# Patient Record
Sex: FEMALE | Race: WHITE | NOT HISPANIC OR LATINO | Employment: FULL TIME | URBAN - METROPOLITAN AREA
[De-identification: names, ages, dates, MRNs, and addresses within clinical notes are randomized per-mention and may not be internally consistent; named-entity substitution may affect disease eponyms.]

---

## 2017-08-18 ENCOUNTER — OFFICE VISIT (OUTPATIENT)
Dept: MEDICAL GROUP | Facility: MEDICAL CENTER | Age: 47
End: 2017-08-18
Payer: COMMERCIAL

## 2017-08-18 VITALS
HEIGHT: 67 IN | DIASTOLIC BLOOD PRESSURE: 110 MMHG | WEIGHT: 176 LBS | HEART RATE: 120 BPM | BODY MASS INDEX: 27.62 KG/M2 | OXYGEN SATURATION: 96 % | TEMPERATURE: 97.9 F | SYSTOLIC BLOOD PRESSURE: 160 MMHG

## 2017-08-18 DIAGNOSIS — Z13.0 SCREENING FOR DEFICIENCY ANEMIA: ICD-10-CM

## 2017-08-18 DIAGNOSIS — E03.9 ACQUIRED HYPOTHYROIDISM: ICD-10-CM

## 2017-08-18 DIAGNOSIS — Z13.220 SCREENING FOR HYPERLIPIDEMIA: ICD-10-CM

## 2017-08-18 DIAGNOSIS — N28.9 ABNORMAL RENAL FUNCTION: ICD-10-CM

## 2017-08-18 DIAGNOSIS — K22.4 ESOPHAGEAL SPASM: ICD-10-CM

## 2017-08-18 DIAGNOSIS — E55.9 VITAMIN D DEFICIENCY: ICD-10-CM

## 2017-08-18 DIAGNOSIS — D68.62 LUPUS ANTICOAGULANT SYNDROME (HCC): ICD-10-CM

## 2017-08-18 DIAGNOSIS — J45.20 MILD INTERMITTENT ASTHMA WITHOUT COMPLICATION: ICD-10-CM

## 2017-08-18 DIAGNOSIS — I10 ESSENTIAL HYPERTENSION: ICD-10-CM

## 2017-08-18 DIAGNOSIS — L60.8 DEFORMITY OF TOENAIL: ICD-10-CM

## 2017-08-18 PROBLEM — F41.0 PANIC ATTACK AS REACTION TO STRESS: Status: RESOLVED | Noted: 2017-08-18 | Resolved: 2017-08-18

## 2017-08-18 PROBLEM — F43.0 PANIC ATTACK AS REACTION TO STRESS: Status: ACTIVE | Noted: 2017-08-18

## 2017-08-18 PROBLEM — F41.0 PANIC ATTACK AS REACTION TO STRESS: Status: ACTIVE | Noted: 2017-08-18

## 2017-08-18 PROBLEM — F43.0 PANIC ATTACK AS REACTION TO STRESS: Status: RESOLVED | Noted: 2017-08-18 | Resolved: 2017-08-18

## 2017-08-18 PROCEDURE — 99215 OFFICE O/P EST HI 40 MIN: CPT | Performed by: FAMILY MEDICINE

## 2017-08-18 RX ORDER — NORETHINDRONE 0.35 MG/1
0.35 TABLET ORAL EVERY EVENING
COMMUNITY
Start: 2017-06-24

## 2017-08-18 RX ORDER — LEVOTHYROXINE SODIUM 88 UG/1
TABLET ORAL
COMMUNITY
Start: 2017-06-15 | End: 2018-03-10

## 2017-08-18 ASSESSMENT — PATIENT HEALTH QUESTIONNAIRE - PHQ9: CLINICAL INTERPRETATION OF PHQ2 SCORE: 0

## 2017-08-18 NOTE — PATIENT INSTRUCTIONS
Esophageal Spasm  An esophageal spasm is a muscle spasm of the tube that connects the back of your throat to your stomach (esophagus). Your esophagus normally moves food and liquids down into your stomach with smooth, wavelike muscle contractions. If you have esophageal spasms, abnormal muscle contractions in your esophagus can be painful and cause you to have difficulty swallowing (dysphagia).  There are two types of esophageal spasms. You may have one or both types:  · Diffuse esophageal spasms. These are irregular, uncoordinated muscle movements of the esophagus. The diffuse type causes more dysphagia.  · Nutcracker esophagus. This is a type of muscle contraction that is coordinated but too strong. The muscles move in a regular order, but the contraction is stronger than necessary. The nutcracker type of esophageal spasm is more painful.  Severe cases of esophageal spasm can make it hard to eat well and do all your usual activities. Esophageal spasms often occur along with severe heartburn (reflux esophagitis). Swallowed foods or liquids may also come back up into your throat (regurgitation).   CAUSES   The cause of esophageal spasm is not known.  RISK FACTORS  You may have a higher risk for esophageal spasm if you:  · Are female.  · Are an older person.  · Eat very quickly.  · Swallow foods or drinks that are very hot or very cold.  · Are depressed or anxious.  SIGNS AND SYMPTOMS   Signs and symptoms can vary from day to day. They may be mild or severe. They may last for minutes or hours. Common signs and symptoms include:  · Chest pain. This may feel like a heart attack.  · Back pain.  · Dysphagia.  · Heartburn.  · The feeling that something is stuck in your throat (globus).  · Regurgitation of foods or liquids.  DIAGNOSIS   Your health care provider may suspect esophageal spasm based on your symptoms and a physical exam. Tests may be done to help confirm the diagnosis. These may include:  · Endoscopy. A  flexible telescope is passed into your esophagus.  · Barium swallow. An X-ray is done while you drink a substance (contrast material) that shows up on X-ray.  · Esophageal manometry. Pressure measurements of the inside of your esophagus are taken while you swallow.  TREATMENT   Mild cases of esophageal spasms may not need to be treated. You may be able to manage the spasms by avoiding the foods and eating habits that trigger them. Treatment for spasms that are more severe or frequent can include the following:  · You may be given medicine to:  ¨ Relax the esophageal muscles.  ¨ Relieve muscle spasms (calcium channel blockers and nitrates).  ¨ Block nerve endings in the esophagus. This is done with an injection of a certain toxin (botulinum).  ¨ Relieve heartburn (proton pump inhibitors).  · Antidepressant medicines are sometimes used to ease symptoms.  · For severe cases, surgery is sometimes done to reduce esophageal muscle contractions (myotomy).  HOME CARE INSTRUCTIONS   · Take medicines only as directed by your health care provider.  · Do not eat foods that trigger heartburn or esophageal spasms.  · Eat your meals slowly.  · Chew your food completely.  · Avoid foods and drinks that are very hot or very cold.  · Find ways to manage stress.  · Ask for help if you struggle with depression or anxiety.  · Keep all follow-up visits as directed by your health care provider. This is important.  SEEK MEDICAL CARE IF:   · Your symptoms change or get worse.  · You are losing weight because of dysphagia.  · Your medicine is not helping your symptoms.  · Your esophageal spasms are interfering with your quality of life.  SEEK IMMEDIATE MEDICAL CARE IF:   · You have very bad or unusual chest pain.  · You have trouble breathing.  · You choke.  MAKE SURE YOU:  · Understand these instructions.  · Will watch your condition.  · Will get help right away if you are not doing well or get worse.     This information is not intended to  replace advice given to you by your health care provider. Make sure you discuss any questions you have with your health care provider.     Document Released: 03/09/2004 Document Revised: 01/08/2016 Document Reviewed: 03/13/2015  [employer] Interactive Patient Education ©2016 [employer]

## 2017-08-18 NOTE — ASSESSMENT & PLAN NOTE
Patient complains of episodes of a crushing chest pain that radiates up to her throat. This usually occurs when she is under a lot of stress. She can't swallow when it's occurring. It lasts for about 15 minutes and then subsides. She did have a negative swallowing test at [facility] year ago. She also had a negative cardiac workup that we are trying to get hold of. She denies any shortness of breath when this happens. She does not have a lot of reflux type symptoms. No excess belching or flatulence.

## 2017-08-18 NOTE — Clinical Note
[facility]  Ewelina Hernandez M.D.  [address]  [place] [address]  Fax: [phone]   Authorization for Release/Disclosure of   Protected Health Information   Name: PENNY MCCALL : 1970 SSN: [id number]   Address: [address] [place] [address] Phone:    [phone] (home)    I authorize the entity listed below to release/disclose the PHI below to:   [facility]/Ewelina Hernandez M.D. and Ewelina Hernandez M.D.   Provider or Entity Name:     Address   City, State, Zip   Phone:      Fax:     Reason for request: continuity of care   Information to be released:    [  ] LAST COLONOSCOPY,  including any PATH REPORT and follow-up  [  ] LAST FIT/COLOGUARD RESULT [  ] LAST DEXA  [  ] LAST MAMMOGRAM  [  ] LAST PAP  [  ] LAST LABS [  ] RETINA EXAM REPORT  [  ] IMMUNIZATION RECORDS  [  ] Release all info      [  ] Check here and initial the line next to each item to release ALL health information INCLUDING  _____ Care and treatment for drug and / or alcohol abuse  _____ HIV testing, infection status, or AIDS  _____ Genetic Testing    DATES OF SERVICE OR TIME PERIOD TO BE DISCLOSED: _____________  I understand and acknowledge that:  * This Authorization may be revoked at any time by you in writing, except if your health information has already been used or disclosed.  * Your health information that will be used or disclosed as a result of you signing this authorization could be re-disclosed by the recipient. If this occurs, your re-disclosed health information may no longer be protected by State or Federal laws.  * You may refuse to sign this Authorization. Your refusal will not affect your ability to obtain treatment.  * This Authorization becomes effective upon signing and will  on (date) __________.      If no date is indicated, this Authorization will  one (1) year from the signature date.    Name: Penny Mccall    Signature:   Date:     2017       PLEASE FAX REQUESTED  RECORDS BACK TO: [phone]

## 2017-08-18 NOTE — MR AVS SNAPSHOT
"        Penny Todd   2017 9:40 AM   Office Visit   MRN: [id number]    Department:  [facility]   Dept Phone:  [phone]    Description:  Female : 1970   Provider:  Ewelina Hernandez M.D.           Reason for Visit     Establish Care     Blood Pressure Problem           Allergies as of 2017     Allergen Noted Reactions    Sulfa Drugs 2016         You were diagnosed with     Acquired hypothyroidism   [0481126]       Lupus anticoagulant syndrome (CMS-HCC)   [320008]       Essential hypertension   [7352205]       Mild intermittent asthma without complication   [732387]       Esophageal spasm   [609381]       Deformity of toenail   [296674]       Vitamin D deficiency   [5705997]       Abnormal renal function   [841374]       Screening for hyperlipidemia   [136428]       Screening for deficiency anemia   [500390]         Vital Signs     Blood Pressure Pulse Temperature Height Weight Body Mass Index    160/110 mmHg 120 36.6 °C (97.9 °F) 1.702 m (5' 7.01\") 79.833 kg (176 lb) 27.56 kg/m2    Oxygen Saturation Last Menstrual Period Breastfeeding? Smoking Status          96% 2017 No Never Smoker         Basic Information     Date Of Birth Sex Race Ethnicity Preferred Language    1970 Female White Non- English      Problem List              ICD-10-CM Priority Class Noted - Resolved    Acquired hypothyroidism E03.9   2017 - Present    Lupus anticoagulant syndrome (CMS-HCC) D68.62   2017 - Present    Essential hypertension I10   2017 - Present    Mild intermittent asthma without complication J45.20   2017 - Present    Esophageal spasm K22.4   2017 - Present    Deformity of toenail L60.8   2017 - Present    Vitamin D deficiency E55.9   2017 - Present    Abnormal renal function N28.9   2017 - Present      Health Maintenance        Date Due Completion Dates    IMM DTaP/Tdap/Td Vaccine (1 - Tdap) 1989 ---    IMM PNEUMOCOCCAL  " (ADULT) MEDIUM RISK SERIES (1 of 1 - PPSV23) 6/30/1989 ---    PAP SMEAR 6/30/1991 ---    MAMMOGRAM 6/30/2010 ---    IMM INFLUENZA (1) 9/1/2017 10/11/2013, 12/14/2012            Current Immunizations     Influenza Vaccine Quad Inj (Pf) 12/14/2012    Influenza Vaccine Quad Inj (Preserved) 10/11/2013      Below and/or attached are the medications your provider expects you to take. Review all of your home medications and newly ordered medications with your provider and/or pharmacist. Follow medication instructions as directed by your provider and/or pharmacist. Please keep your medication list with you and share with your provider. Update the information when medications are discontinued, doses are changed, or new medications (including over-the-counter products) are added; and carry medication information at all times in the event of emergency situations     Allergies:  SULFA DRUGS - (reactions not documented)               Medications  Valid as of: August 18, 2017 - 10:36 AM    Generic Name Brand Name Tablet Size Instructions for use    Albuterol Sulfate (Aero Soln) albuterol 108 (90 Base) MCG/ACT Inhale 2 Puffs by mouth every 6 hours as needed for Shortness of Breath.        Levothyroxine Sodium   Take  by mouth.        Levothyroxine Sodium (Tab) SYNTHROID 88 MCG         Multiple Vitamins-Minerals   Take  by mouth.        Norethindrone (Tab) JENCYCLA 0.35 MG         Promethazine-Phenyleph-Codeine (Syrup) PHENERGAN VC CODEINE 5-6.25-10 MG/5ML Take 5 mL by mouth every 8 hours as needed.        Pseudoeph-Doxylamine-DM-APAP   Take  by mouth.        .                 Medicines prescribed today were sent to:     [facility] [address] [place] [address]    Phone: [phone] Fax: [phone]    Open 24 Hours?: No      Medication refill instructions:       If your prescription bottle indicates you have medication refills left, it is not necessary to call your provider’s  office. Please contact your pharmacy and they will refill your medication.    If your prescription bottle indicates you do not have any refills left, you may request refills at any time through one of the following ways: The online [employer] system (except Urgent Care), by calling your provider’s office, or by asking your pharmacy to contact your provider’s office with a refill request. Medication refills are processed only during regular business hours and may not be available until the next business day. Your provider may request additional information or to have a follow-up visit with you prior to refilling your medication.   *Please Note: Medication refills are assigned a new Rx number when refilled electronically. Your pharmacy may indicate that no refills were authorized even though a new prescription for the same medication is available at the pharmacy. Please request the medicine by name with the pharmacy before contacting your provider for a refill.        Your To Do List     Future Labs/Procedures Complete By Expires    JAIRO ANTIBODY WITH REFLEX  As directed 8/19/2018    CBC WITH DIFFERENTIAL  As directed 8/19/2018    COMP METABOLIC PANEL  As directed 8/19/2018    FREE THYROXINE  As directed 8/19/2018    LIPID PROFILE  As directed 8/19/2018    LUPUS ANTICOAGULANT  As directed 8/18/2018    TRIIDOTHYRONINE  As directed 8/18/2018    TSH  As directed 8/19/2018    URINALYSIS,CULTURE IF INDICATED  As directed 8/18/2018    VITAMIN D,25 HYDROXY  As directed 8/19/2018    WESTERGREN SED RATE  As directed 8/19/2018      Referral     A referral request has been sent to our patient care coordination department. Please allow 3-5 business days for us to process this request and contact you either by phone or mail. If you do not hear from us by the 5th business day, please call us at [phone].        Instructions    Esophageal Spasm  An esophageal spasm is a muscle spasm of the tube that connects the back of your  throat to your stomach (esophagus). Your esophagus normally moves food and liquids down into your stomach with smooth, wavelike muscle contractions. If you have esophageal spasms, abnormal muscle contractions in your esophagus can be painful and cause you to have difficulty swallowing (dysphagia).  There are two types of esophageal spasms. You may have one or both types:  · Diffuse esophageal spasms. These are irregular, uncoordinated muscle movements of the esophagus. The diffuse type causes more dysphagia.  · Nutcracker esophagus. This is a type of muscle contraction that is coordinated but too strong. The muscles move in a regular order, but the contraction is stronger than necessary. The nutcracker type of esophageal spasm is more painful.  Severe cases of esophageal spasm can make it hard to eat well and do all your usual activities. Esophageal spasms often occur along with severe heartburn (reflux esophagitis). Swallowed foods or liquids may also come back up into your throat (regurgitation).   CAUSES   The cause of esophageal spasm is not known.  RISK FACTORS  You may have a higher risk for esophageal spasm if you:  · Are female.  · Are an older person.  · Eat very quickly.  · Swallow foods or drinks that are very hot or very cold.  · Are depressed or anxious.  SIGNS AND SYMPTOMS   Signs and symptoms can vary from day to day. They may be mild or severe. They may last for minutes or hours. Common signs and symptoms include:  · Chest pain. This may feel like a heart attack.  · Back pain.  · Dysphagia.  · Heartburn.  · The feeling that something is stuck in your throat (globus).  · Regurgitation of foods or liquids.  DIAGNOSIS   Your health care provider may suspect esophageal spasm based on your symptoms and a physical exam. Tests may be done to help confirm the diagnosis. These may include:  · Endoscopy. A flexible telescope is passed into your esophagus.  · Barium swallow. An X-ray is done while you drink a  substance (contrast material) that shows up on X-ray.  · Esophageal manometry. Pressure measurements of the inside of your esophagus are taken while you swallow.  TREATMENT   Mild cases of esophageal spasms may not need to be treated. You may be able to manage the spasms by avoiding the foods and eating habits that trigger them. Treatment for spasms that are more severe or frequent can include the following:  · You may be given medicine to:  ¨ Relax the esophageal muscles.  ¨ Relieve muscle spasms (calcium channel blockers and nitrates).  ¨ Block nerve endings in the esophagus. This is done with an injection of a certain toxin (botulinum).  ¨ Relieve heartburn (proton pump inhibitors).  · Antidepressant medicines are sometimes used to ease symptoms.  · For severe cases, surgery is sometimes done to reduce esophageal muscle contractions (myotomy).  HOME CARE INSTRUCTIONS   · Take medicines only as directed by your health care provider.  · Do not eat foods that trigger heartburn or esophageal spasms.  · Eat your meals slowly.  · Chew your food completely.  · Avoid foods and drinks that are very hot or very cold.  · Find ways to manage stress.  · Ask for help if you struggle with depression or anxiety.  · Keep all follow-up visits as directed by your health care provider. This is important.  SEEK MEDICAL CARE IF:   · Your symptoms change or get worse.  · You are losing weight because of dysphagia.  · Your medicine is not helping your symptoms.  · Your esophageal spasms are interfering with your quality of life.  SEEK IMMEDIATE MEDICAL CARE IF:   · You have very bad or unusual chest pain.  · You have trouble breathing.  · You choke.  MAKE SURE YOU:  · Understand these instructions.  · Will watch your condition.  · Will get help right away if you are not doing well or get worse.     This information is not intended to replace advice given to you by your health care provider. Make sure you discuss any questions you have  with your health care provider.     Document Released: 03/09/2004 Document Revised: 01/08/2016 Document Reviewed: 03/13/2015  [employer] Interactive Patient Education ©2016 [employer]            [employer] Access Code: Activation code not generated  Current Write.my Status: Active

## 2017-08-18 NOTE — ASSESSMENT & PLAN NOTE
Patient recently started taking Plexus Slim supplement that has green coffee bean extract.  The elevations in her blood pressure seemed to have been since then. She was on medications in the past but had stopped those because her blood pressure had normalized. She is having occasional headaches but no syncope. No palpitations.

## 2017-08-18 NOTE — ASSESSMENT & PLAN NOTE
Took terbinofine for one month but developed a rash.  Used bleach at that helped and then OTC undecylenic acid. She has some thickening of the nail and it feels as if it's pulling up.

## 2017-08-18 NOTE — ASSESSMENT & PLAN NOTE
Patient had a creatinine of 1.02 and 10/25/16 with a GFR of 58. Her labs have been normal prior to that.

## 2017-08-18 NOTE — ASSESSMENT & PLAN NOTE
Currently taking levothyroxine 88 mcg daily as prescribed, it was decreased from 100 mcg on 6/15/17.  Denies palpitations, skin changes, temperature intolerance, or changes in bowel habits  3 T4 on 6/12/17 was 1.26 with a TSH of 0.31  TSH was 0.66 on 10/25/16 and 0.45 on 6/10/16

## 2017-08-25 ENCOUNTER — TELEPHONE (OUTPATIENT)
Dept: MEDICAL GROUP | Facility: MEDICAL CENTER | Age: 47
End: 2017-08-25

## 2017-08-25 RX ORDER — LISINOPRIL 10 MG/1
10 TABLET ORAL DAILY
Qty: 30 TAB | Refills: 2 | Status: SHIPPED | OUTPATIENT
Start: 2017-08-25 | End: 2017-09-18

## 2017-08-25 NOTE — ASSESSMENT & PLAN NOTE
Patient reports that she had a positive lupus anticoagulant test and had multiple miscarriages. I reviewed her [facility] records and her anticoagulant was actually negative on her last testing. We do not have labs from further back. She denies any blood clots or pulmonary embolism.

## 2017-08-25 NOTE — ASSESSMENT & PLAN NOTE
Patient only gets symptoms with exercise and in the spring. She does not currently have an albuterol prescription but would like one. She is uses in the past with good effect.  Denies recent or current exacerbation.  Denies current shortness of breath, chest pain, or cough.

## 2017-08-25 NOTE — TELEPHONE ENCOUNTER
Patient called states she did receive your my chart message and she would like to start on the blood pressure medication. She does have an appt on 9/18/17 to follow up with you.

## 2017-08-25 NOTE — PROGRESS NOTES
Chief Complaint   Patient presents with   • Establish Care   • Blood Pressure Problem         Penny Todd is a 47 y.o. female here to establish care and for evaluation and management of:        HPI:    Acquired hypothyroidism   Currently taking levothyroxine 88 mcg daily as prescribed, it was decreased from 100 mcg on 6/15/17.  Denies palpitations, skin changes, temperature intolerance, or changes in bowel habits  3 T4 on 6/12/17 was 1.26 with a TSH of 0.31  TSH was 0.66 on 10/25/16 and 0.45 on 6/10/16      Panic attack as reaction to stress  Feels like he gets a bubble in her throat and that she can't swallow    Deformity of toenail  Took terbinofine for one month but developed a rash.  Used bleach at that helped and then OTC undecylenic acid. She has some thickening of the nail and it feels as if it's pulling up.    Essential hypertension  Patient recently started taking Plexus Slim supplement that has green coffee bean extract.  The elevations in her blood pressure seemed to have been since then. She was on medications in the past but had stopped those because her blood pressure had normalized. She is having occasional headaches but no syncope. No palpitations.    Esophageal spasm  Patient complains of episodes of a crushing chest pain that radiates up to her throat. This usually occurs when she is under a lot of stress. She can't swallow when it's occurring. It lasts for about 15 minutes and then subsides. She did have a negative swallowing test at [facility] year ago. She also had a negative cardiac workup that we are trying to get hold of. She denies any shortness of breath when this happens. She does not have a lot of reflux type symptoms. No excess belching or flatulence.    Abnormal renal function  Patient had a creatinine of 1.02 and 10/25/16 with a GFR of 58. Her labs have been normal prior to that.    Vitamin D deficiency  Vitamin D was 33 on 6/10/16.    Mild intermittent asthma without  complication  Patient only gets symptoms with exercise and in the spring. She does not currently have an albuterol prescription but would like one. She is uses in the past with good effect.  Denies recent or current exacerbation.  Denies current shortness of breath, chest pain, or cough.            Lupus anticoagulant syndrome (CMS-HCC)  Patient reports that she had a positive lupus anticoagulant test and had multiple miscarriages. I reviewed her [facility] records and her anticoagulant was actually negative on her last testing. We do not have labs from further back. She denies any blood clots or pulmonary embolism.      Allergies   Allergen Reactions   • Sulfa Drugs        Current medicines (including changes today)  Current Outpatient Prescriptions   Medication Sig Dispense Refill   • levothyroxine (SYNTHROID) 88 MCG Tab      • JENCYCLA 0.35 MG tablet      • albuterol (VENTOLIN OR PROVENTIL) 108 (90 BASE) MCG/ACT Aero Soln inhalation aerosol Inhale 2 Puffs by mouth every 6 hours as needed for Shortness of Breath. 8.5 g 1   • Pseudoeph-Doxylamine-DM-APAP (NYQUIL PO) Take  by mouth.     • LEVOTHYROXINE SODIUM PO Take  by mouth.     • Multiple Vitamins-Minerals (CENTRUM ADULTS PO) Take  by mouth.     • prometh-phenylephrine-codeine (PHENERGAN VC CODEINE) 5-6.25-10 MG/5ML Syrup Take 5 mL by mouth every 8 hours as needed. 70 mL 0     No current facility-administered medications for this visit.     She  has a past medical history of Allergy; Anxiety; Asthma; Clotting disorder (CMS-[facility]); Thyroid disease; and Arrhythmia.  She  has no past surgical history on file.  Social History   Substance Use Topics   • Smoking status: Never Smoker    • Smokeless tobacco: Never Used   • Alcohol Use: No     Social History     Social History Narrative     Family History   Problem Relation Age of Onset   • Heart Disease Father    • Alcohol/Drug Father    • Hyperlipidemia Father    • Hypertension Father      Family Status   Relation Status  "Death Age   • Mother Other    • Father Alive    • Sister Alive    • Sister Alive          ROS  No fever or chills.  No nausea or vomiting.  No palpitations.  No cough or SOB.  No pain with urination or hematuria.  No black or bloody stools.  All other systems reviewed and are negative     Objective:     Blood pressure 160/110, pulse 120, temperature 36.6 °C (97.9 °F), height 1.702 m (5' 7.01\"), weight 79.833 kg (176 lb), last menstrual period 04/18/2017, SpO2 96 %, not currently breastfeeding. Body mass index is 27.56 kg/(m^2).  Physical Exam:      Well developed, well nourished.  Alert, oriented in no acute distress.  Psych: Eye contact is good, speech goal directed, affect anxious  Eyes: conjunctiva non-injected, sclera non-icteric.  Ears: Pinna normal. TM pearly gray.   Nose: Nares are patent.  Normal mucosa  Mouth: Oral mucous membranes pink and moist with no lesions.  Neck Supple.  No adenopathy or masses in the neck or supraclavicular regions. No thyromegaly  Lungs: clear to auscultation bilaterally with good excursion. No wheezes or rhonchi  CV: regular rate and rhythm. No murmur  Abdomen: soft, nontender, no masses or organomegaly.  No rebound or guarding  Ext: no edema, color normal, vascularity normal, temperature normal. Right toenail with apparent healthy nail underneath with overgrowth of the thickened nail with some lifting      Assessment and Plan:   The following treatment plan was discussed    1. Acquired hypothyroidism  Recheck labs and adjust dose as needed  - TSH; Future  - FREE THYROXINE; Future  - TRIIDOTHYRONINE; Future    2. Lupus anticoagulant syndrome (CMS-HCC)  Recheck labs. Consider rheumatology referral  - CBC WITH DIFFERENTIAL; Future  - JAIRO ANTIBODY WITH REFLEX; Future  - WESTERGREN SED RATE; Future  - LUPUS ANTICOAGULANT; Future    3. Essential hypertension  Stop her current supplement and monitor blood pressures. Patient to send me the records of those and we'll consider medication " at that time.    4. Mild intermittent asthma without complication  Refill albuterol for occasional use    5. Esophageal spasm  Refer to GI. Patient education material given  - REFERRAL TO GASTROENTEROLOGY    6. Deformity of toenail  Discussed that this may need to be removed. Refer to podiatry for further evaluation and treatment  - REFERRAL TO PODIATRY    7. Vitamin D deficiency  Check vitamin D level and adjust supplementation as needed  - VITAMIN D,25 HYDROXY; Future    8. Abnormal renal function  Recheck labs  - CBC WITH DIFFERENTIAL; Future  - COMP METABOLIC PANEL; Future    9. Screening for hyperlipidemia  Screening labs ordered.  Await results for counseling.    - LIPID PROFILE; Future    10. Screening for deficiency anemia  Screening labs ordered.  Await results for counseling.    - CBC WITH DIFFERENTIAL; Future      Records requested.  Spent 50 minutes in face-to-face patient contact in which greater than 50% of the visit was spent in counseling/coordination of care       Any change or worsening of signs or symptoms, patient encouraged to follow-up or report to the emergency room for further evaluation. Patient understands and agrees.    Followup: Return in about 3 months (around 11/18/2017).

## 2017-08-30 ENCOUNTER — TELEPHONE (OUTPATIENT)
Dept: MEDICAL GROUP | Facility: MEDICAL CENTER | Age: 47
End: 2017-08-30

## 2017-08-30 NOTE — TELEPHONE ENCOUNTER
Patient called states that she has noticed her pulse is running in the 90s and she thinks it has to do with her BP medication. I advised patient that her pulse was 120 at her initial visit and that the BP medication should not affect her pulse. She states she takes her BP medication everyday at 12:30 pm, I advised to take her medication at dinner time and monitor her BP reading/pulse and send us a my chart message with her readings to see if that will change.

## 2017-09-18 ENCOUNTER — OFFICE VISIT (OUTPATIENT)
Dept: MEDICAL GROUP | Facility: MEDICAL CENTER | Age: 47
End: 2017-09-18
Payer: COMMERCIAL

## 2017-09-18 VITALS
BODY MASS INDEX: 27.31 KG/M2 | SYSTOLIC BLOOD PRESSURE: 140 MMHG | DIASTOLIC BLOOD PRESSURE: 90 MMHG | WEIGHT: 174 LBS | HEART RATE: 113 BPM | HEIGHT: 67 IN | OXYGEN SATURATION: 98 % | TEMPERATURE: 98.3 F

## 2017-09-18 DIAGNOSIS — I10 ESSENTIAL HYPERTENSION: ICD-10-CM

## 2017-09-18 DIAGNOSIS — F41.8 SITUATIONAL ANXIETY: ICD-10-CM

## 2017-09-18 PROCEDURE — 99214 OFFICE O/P EST MOD 30 MIN: CPT | Performed by: FAMILY MEDICINE

## 2017-09-18 RX ORDER — LORAZEPAM 0.5 MG/1
0.5 TABLET ORAL
Qty: 15 TAB | Refills: 0 | Status: ON HOLD | OUTPATIENT
Start: 2017-09-18 | End: 2019-09-18

## 2017-09-18 RX ORDER — LISINOPRIL 5 MG/1
5 TABLET ORAL 2 TIMES DAILY
Qty: 60 TAB | Refills: 3 | Status: SHIPPED | OUTPATIENT
Start: 2017-09-18 | End: 2017-09-20 | Stop reason: SDUPTHER

## 2017-09-19 NOTE — ASSESSMENT & PLAN NOTE
Patient would like to a prescription for when she has the episodes where she feels likes she can't breathe or swallow. These usually only lasts 15 minutes and have been decreased with her blood pressure under control. She is fairly anxious by nature.

## 2017-09-19 NOTE — PROGRESS NOTES
Subjective:     Chief Complaint   Patient presents with   • Follow-Up   • Anxiety       Penny Todd is a 47 y.o. female here today for evaluation and management of:    Essential hypertension   Currently taking Prinivil as directed.   She is not taking baby aspirin daily. 120/83, 111/67, 127/87  She is monitoring BP at home.  Her readings   Denies symptoms low BP: light-headed, tunnel-vision, unusual fatigue.   Denies symptoms high BP:pounding headache, visual changes, flushed face.   Denies medicine side effects: unusual fatigue, slow heartbeat, foot/leg swelling, cough.  She gets a pounding heart as her dose wears off.      Situational anxiety  Patient would like to a prescription for when she has the episodes where she feels likes she can't breathe or swallow. These usually only lasts 15 minutes and have been decreased with her blood pressure under control. She is fairly anxious by nature.       Allergies   Allergen Reactions   • Sulfa Drugs        Current medicines (including changes today)  Current Outpatient Prescriptions   Medication Sig Dispense Refill   • lisinopril (PRINIVIL) 5 MG Tab Take 1 Tab by mouth 2 times a day. 60 Tab 3   • lorazepam (ATIVAN) 0.5 MG Tab Take 1 Tab by mouth 1 time daily as needed for Anxiety. 15 Tab 0   • levothyroxine (SYNTHROID) 88 MCG Tab      • JENCYCLA 0.35 MG tablet      • Multiple Vitamins-Minerals (CENTRUM ADULTS PO) Take  by mouth.     • albuterol (VENTOLIN OR PROVENTIL) 108 (90 BASE) MCG/ACT Aero Soln inhalation aerosol Inhale 2 Puffs by mouth every 6 hours as needed for Shortness of Breath. 8.5 g 1   • Pseudoeph-Doxylamine-DM-APAP (NYQUIL PO) Take  by mouth.     • LEVOTHYROXINE SODIUM PO Take  by mouth.     • prometh-phenylephrine-codeine (PHENERGAN VC CODEINE) 5-6.25-10 MG/5ML Syrup Take 5 mL by mouth every 8 hours as needed. 70 mL 0     No current facility-administered medications for this visit.        She  has a past medical history of Allergy; Anxiety;  "Arrhythmia; Asthma; Clotting disorder (CMS-HCC); and Thyroid disease.    Patient Active Problem List    Diagnosis Date Noted   • Situational anxiety 09/18/2017   • Acquired hypothyroidism 08/18/2017   • Lupus anticoagulant syndrome (CMS-HCC) 08/18/2017   • Essential hypertension 08/18/2017   • Mild intermittent asthma without complication 08/18/2017   • Esophageal spasm 08/18/2017   • Deformity of toenail 08/18/2017   • Vitamin D deficiency 08/18/2017   • Abnormal renal function 08/18/2017       ROS   No fever or chills.  No nausea or vomiting.  No chest pain or palpitations.  No cough or SOB.  No pain with urination or hematuria.  No black or bloody stools.       Objective:     Blood pressure 140/90, pulse (!) 113, temperature 36.8 °C (98.3 °F), height 1.702 m (5' 7\"), weight 78.9 kg (174 lb), SpO2 98 %. Body mass index is 27.25 kg/m².   Physical Exam:  Well developed, well nourished.  Alert, oriented in no acute distress.  Eye contact is good, speech goal directed, affect calm  Eyes: conjunctiva non-injected, sclera non-icteric.  Neck Supple.  No adenopathy or masses in the neck or supraclavicular regions. No thyromegaly  Lungs: clear to auscultation bilaterally with good excursion. No wheezes or rhonchi  CV:Tachycardic but regular rhythm. No murmur        Assessment and Plan:   The following treatment plan was discussed    1. Essential hypertension  Switch to lisinopril 5 mg twice a day to see if she has more even response. Patient to be no me in 3 weeks to let me know how she is doing  - lisinopril (PRINIVIL) 5 MG Tab; Take 1 Tab by mouth 2 times a day.  Dispense: 60 Tab; Refill: 3    2. Situational anxiety  Discussed that he will take 30 minutes for the medicine to kick in. We discussed relaxation techniques. Okay to use lorazepam sublingually for severe attacks.  - lorazepam (ATIVAN) 0.5 MG Tab; Take 1 Tab by mouth 1 time daily as needed for Anxiety.  Dispense: 15 Tab; Refill: 0    Any change or worsening of " signs or symptoms, patient encouraged to follow-up or report to the emergency room for further evaluation. Patient understands and agrees.    Followup: Return in about 6 months (around 3/18/2018).

## 2017-09-19 NOTE — ASSESSMENT & PLAN NOTE
Currently taking Prinivil as directed.   She is not taking baby aspirin daily. 120/83, 111/67, 127/87  She is monitoring BP at home.  Her readings   Denies symptoms low BP: light-headed, tunnel-vision, unusual fatigue.   Denies symptoms high BP:pounding headache, visual changes, flushed face.   Denies medicine side effects: unusual fatigue, slow heartbeat, foot/leg swelling, cough.  She gets a pounding heart as her dose wears off.

## 2017-09-20 DIAGNOSIS — I10 ESSENTIAL HYPERTENSION: ICD-10-CM

## 2017-09-20 RX ORDER — LISINOPRIL 5 MG/1
5 TABLET ORAL 2 TIMES DAILY
Qty: 180 TAB | Refills: 3 | Status: SHIPPED | OUTPATIENT
Start: 2017-09-20 | End: 2018-06-13

## 2017-11-17 ENCOUNTER — TELEPHONE (OUTPATIENT)
Dept: MEDICAL GROUP | Facility: MEDICAL CENTER | Age: 47
End: 2017-11-17

## 2017-11-17 NOTE — TELEPHONE ENCOUNTER
1. Caller Name: Pt                      Call Back Number: [phone] until 2:30 or [phone] (home)       2. Message: Pt called stating she now is taking Lisinopril 5 mg in the morning. She has been feeling different lately and having head rushes when standing, but not like she is going to faint. Last night her BP was 109/60. Should she cut the 5mg of Lisinopril.     3. Patient approves office to leave a detailed voicemail/MyChart message: n/a

## 2018-03-09 DIAGNOSIS — E03.9 ACQUIRED HYPOTHYROIDISM: ICD-10-CM

## 2018-03-10 RX ORDER — LEVOTHYROXINE SODIUM 88 UG/1
88 TABLET ORAL
Qty: 30 TAB | Refills: 0 | Status: SHIPPED | OUTPATIENT
Start: 2018-03-10 | End: 2018-04-10 | Stop reason: SDUPTHER

## 2018-03-20 NOTE — TELEPHONE ENCOUNTER
Patient should be advised to go to the ER if she is having chest pain.  Labs ordered.  Ewelina Hernandez M.D.

## 2018-03-20 NOTE — TELEPHONE ENCOUNTER
Pt called in and is asking to have her Thyroid lab order faxed to [facility] in [place]. Pt states she has left several VM requesting this and has not heard back. Pt is going to document her BP readings and send them in a [employer] message as she is feeling some pressure when she stands up after sitting and feeling stressed. Pt will schedule an apt if you would like her to come in office, but would rather send readings through [employer] to start out.

## 2018-04-10 DIAGNOSIS — E03.9 ACQUIRED HYPOTHYROIDISM: ICD-10-CM

## 2018-04-10 NOTE — TELEPHONE ENCOUNTER
From: Penny Todd  Sent: 4/10/2018 11:23 AM PDT  Subject: Medication Renewal Request    Penny Todd would like a refill of the following medications:     levothyroxine (SYNTHROID) 88 MCG Tab [Ewelina Hernandez M.D.]   Patient Comment: I did lab work few weeks ago. [facility] pharmacy does not show a new prescription yet. Please send new prescription.    Preferred pharmacy: [facility] [address] [place], [place] - [address]

## 2018-04-12 RX ORDER — LEVOTHYROXINE SODIUM 88 UG/1
88 TABLET ORAL
Qty: 90 TAB | Refills: 1 | Status: SHIPPED | OUTPATIENT
Start: 2018-04-12 | End: 2018-10-13 | Stop reason: SDUPTHER

## 2018-05-30 ENCOUNTER — OFFICE VISIT (OUTPATIENT)
Dept: MEDICAL GROUP | Facility: PHYSICIAN GROUP | Age: 48
End: 2018-05-30
Payer: COMMERCIAL

## 2018-05-30 VITALS
WEIGHT: 177.9 LBS | DIASTOLIC BLOOD PRESSURE: 90 MMHG | SYSTOLIC BLOOD PRESSURE: 140 MMHG | TEMPERATURE: 98.6 F | RESPIRATION RATE: 16 BRPM | OXYGEN SATURATION: 96 % | BODY MASS INDEX: 27.92 KG/M2 | HEART RATE: 95 BPM | HEIGHT: 67 IN

## 2018-05-30 DIAGNOSIS — I10 ESSENTIAL HYPERTENSION: ICD-10-CM

## 2018-05-30 DIAGNOSIS — N95.1 PERIMENOPAUSE: ICD-10-CM

## 2018-05-30 DIAGNOSIS — M67.431 GANGLION, RIGHT WRIST: ICD-10-CM

## 2018-05-30 DIAGNOSIS — F41.1 GAD (GENERALIZED ANXIETY DISORDER): Primary | ICD-10-CM

## 2018-05-30 DIAGNOSIS — I10 ELEVATED BLOOD PRESSURE READING IN OFFICE WITH WHITE COAT SYNDROME, WITH DIAGNOSIS OF HYPERTENSION: ICD-10-CM

## 2018-05-30 DIAGNOSIS — F41.1 GENERALIZED ANXIETY DISORDER: ICD-10-CM

## 2018-05-30 PROCEDURE — 99214 OFFICE O/P EST MOD 30 MIN: CPT | Performed by: NURSE PRACTITIONER

## 2018-05-30 RX ORDER — CITALOPRAM HYDROBROMIDE 10 MG/1
10 TABLET ORAL DAILY
Qty: 30 TAB | Refills: 2 | Status: SHIPPED | OUTPATIENT
Start: 2018-05-30 | End: 2018-07-16 | Stop reason: SDUPTHER

## 2018-05-30 NOTE — PROGRESS NOTES
[place] [facility]  Primary Care Office Visit - Problem-Oriented        History:     Penny Todd is a 47 y.o. female who is here today to discuss Hypertension ([facility])      Ganglion, right wrist  Patient is a 47-year-old female who has noticed a nodularity on the lateral right wrist that has been persistent for a few months.  It is nontender, no redness, no fluctuance.     Essential hypertension  Patient is a 47-year-old female with hypertension here for follow-up.  She was diagnosed over a year ago and has slowly tapered down on her lisinopril due to multiple hypotensive episodes.  Currently utilizing lisinopril 2.5 mg at night only.  Blood pressure readings have been generally 112-130/70-88.  She has had one reading of 140 SBP in the last 2 weeks.  She has noted that with blood pressure readings ranging 112-120 she has lightheadedness.  She also reports anxiety, she tells me that when she wakes up she immediately starts to feel anxious.  Her anxiety has never formally been addressed, she feels anxious when driving, in new places, around new people.  She likely has a component of whitecoat syndrome.  Her repeat blood pressure today is 142/90, her home blood pressure cuff reads 152/100.  She does admit that she is quite anxious meeting a new provider today and begin a new clinic.  She also had to drive herself to this appointment on her own which is particularly anxiety provoking.  She denies chest pain, shortness of breath, change in vision, headaches.  She is due for labs.            Generalized anxiety disorder  Patient is a 47-year-old female with anxiety that affects her ability to perform ADLs, it also affects her work.  She is specifically her anxiety is exacerbated by meeting new people, being in new places, driving.  She does have rare episodes of panic attacks where she feels like she cannot breathe or swallow, she was prescribed Lorazepam 0.5 mg #15 tablets by her PCP in September, she has not used any  "of these tablets.  Her blood pressure is slightly elevated in the office today at 142/90, she took lisinopril 2.5 mg last night, she admits that she is very anxious about being in a new place and meeting a new provider today.  She also has trouble falling asleep at night due to her anxiety.      Perimenopause  Patient is a 47-year-old female who was reportedly told by her OB/GYN that she is in perimenopause, having irregular menses and hot flashes, currently on Jencycla for contraception due to positive lupus anticoagulant.  This will be discontinued in the coming months to test \"menopause\" - FSH, LH, estradiol. Mammo done at [facility] and normal. She will have PAP at her follow up in 2 months.         Past Medical History:   Diagnosis Date   • Allergy    • Anxiety    • Arrhythmia     Sounds like PVC   • Asthma    • Clotting disorder (HCC)    • Thyroid disease     was hyperthyroid and had radiation to thyroid     History reviewed. No pertinent surgical history.  Social History     Social History   • Marital status:      Spouse name: N/A   • Number of children: N/A   • Years of education: N/A     Occupational History   • Not on file.     Social History Main Topics   • Smoking status: Never Smoker   • Smokeless tobacco: Never Used   • Alcohol use No   • Drug use: No   • Sexual activity: Yes     Partners: Male     Birth control/ protection: Pill     Other Topics Concern   • Not on file     Social History Narrative   • No narrative on file     History   Smoking Status   • Never Smoker   Smokeless Tobacco   • Never Used     Family History   Problem Relation Age of Onset   • Heart Disease Father    • Alcohol/Drug Father    • Hyperlipidemia Father    • Hypertension Father      Allergies   Allergen Reactions   • Sulfa Drugs        Problem List:     Patient Active Problem List    Diagnosis Date Noted   • Ganglion, right wrist 05/30/2018   • Perimenopause 05/30/2018   • Generalized anxiety disorder 09/18/2017   • Acquired " "hypothyroidism 08/18/2017   • Lupus anticoagulant syndrome (HCC) 08/18/2017   • Essential hypertension 08/18/2017   • Mild intermittent asthma without complication 08/18/2017   • Esophageal spasm 08/18/2017   • Deformity of toenail 08/18/2017   • Vitamin D deficiency 08/18/2017   • Abnormal renal function 08/18/2017         Medications:     Current Outpatient Prescriptions:   •  citalopram (CELEXA) 10 MG tablet, Take 1 Tab by mouth every day., Disp: 30 Tab, Rfl: 2  •  levothyroxine (SYNTHROID) 88 MCG Tab, Take 1 Tab by mouth Every morning on an empty stomach., Disp: 90 Tab, Rfl: 1  •  lisinopril (PRINIVIL) 5 MG Tab, Take 1 Tab by mouth 2 times a day., Disp: 180 Tab, Rfl: 3  •  JENCYCLA 0.35 MG tablet, , Disp: , Rfl:   •  Multiple Vitamins-Minerals (CENTRUM ADULTS PO), Take  by mouth., Disp: , Rfl:   •  lorazepam (ATIVAN) 0.5 MG Tab, Take 1 Tab by mouth 1 time daily as needed for Anxiety., Disp: 15 Tab, Rfl: 0  •  Pseudoeph-Doxylamine-DM-APAP (NYQUIL PO), Take  by mouth., Disp: , Rfl:   •  prometh-phenylephrine-codeine (PHENERGAN VC CODEINE) 5-6.25-10 MG/5ML Syrup, Take 5 mL by mouth every 8 hours as needed., Disp: 70 mL, Rfl: 0  •  albuterol (VENTOLIN OR PROVENTIL) 108 (90 BASE) MCG/ACT Aero Soln inhalation aerosol, Inhale 2 Puffs by mouth every 6 hours as needed for Shortness of Breath., Disp: 8.5 g, Rfl: 1      Review of Systems:     Pertinent positives as per HPI, all other systems reviewed and WNL    Physical Assessment:     VS: /90   Pulse 95   Temp 37 °C (98.6 °F)   Resp 16   Ht 1.702 m (5' 7.01\")   Wt 80.7 kg (177 lb 14.4 oz)   SpO2 96%   Breastfeeding? No   BMI 27.86 kg/m²     General: Well-developed, well-nourished, female     Head: PERRL, EOMI. Normocephalic. No facial asymmetry noted.  Cardiovasc:RRR, no MRG. No thrills or bruits. Pulses 2+ and symmetric at all distal extremities.  Pulmonary: Lungs clear bilaterally.  Normal respiratory effort. No wheeze or crackles.   Extremities: right " lateral wrist with cyst consistent with ganglion cyst. No edema, no TTP bilateral calves. Pedal pulses intact. No joint effusions. LEs warm and well-perfused.  Neuro: Alert and oriented, CNs II-XII intact, no focal deficits.  Skin:No rashes noted. Skin warm, dry, intact    Psych: Dressed appropriately for the weather, pleasant and conversant.  Affect, mood & judgment appropriate.    TOBY-7 SCORE 21     Assessment/Plan:   Penny was seen today for hypertension.    Diagnoses and all orders for this visit:    TOBY (generalized anxiety disorder), uncontrolled   -Recommend Celexa 10 mg daily for both generalized anxiety disorder as well as menopausal hot flashes, we did discuss the side effects of the medication, handout provided.  She will follow-up in 2 weeks, sooner if needed.  -     citalopram (CELEXA) 10 MG tablet; Take 1 Tab by mouth every day.    Ganglion, right wrist, new  -Reassurance continue to monitor, follow-up if symptoms worsen or persist    Elevated blood pressure reading in office with white coat syndrome, with diagnosis of hypertension, uncontrolled  -I suspect that her elevated blood pressure is largely related to her anxiety and white coat syndrome, her home blood pressure cuff actually reads 10 mmHg higher than our wall amount and she has been having significant low readings with lightheadedness.  Given that she is currently only utilizing lisinopril 2.5 mg HS we will trial discontinuation ×2 weeks, address her anxiety and asked that she follow-up in the clinic for appointment.  She will continue to monitor her blood pressure at home and contact me if readings are >150/100.     Essential hypertension, uncontrolled, treatment plan as above     Perimenopause, unclear control   - following with OBGYN, will attempt to obtain mammo and PAP, start celexa for anxiety and hot flashes as per #1.       Patient is agreeable to the above plan and voiced understanding. All questions answered.     Please note that  this dictation was created using voice recognition software. I have made every reasonable attempt to correct obvious errors, but I expect that there are errors of grammar and possibly content that I did not discover before finalizing the note.      LULU Peterson  5/30/2018, 9:51 AM

## 2018-05-30 NOTE — ASSESSMENT & PLAN NOTE
Patient is a 47-year-old female who has noticed a nodularity on the lateral right wrist that has been persistent for a few months.  It is nontender, no redness, no fluctuance.

## 2018-05-30 NOTE — ASSESSMENT & PLAN NOTE
"Patient is a 47-year-old female who was reportedly told by her OB/GYN that she is in perimenopause, having irregular menses and hot flashes, currently on Jencycla for contraception due to positive lupus anticoagulant.  This will be discontinued in the coming months to test \"menopause\" - FSH, LH, estradiol. Mammo done at [facility] and normal. She will have PAP at her follow up in 2 months.   "

## 2018-05-30 NOTE — ASSESSMENT & PLAN NOTE
Patient is a 47-year-old female with hypertension here for follow-up.  She was diagnosed over a year ago and has slowly tapered down on her lisinopril due to multiple hypotensive episodes.  Currently utilizing lisinopril 2.5 mg at night only.  Blood pressure readings have been generally 112-130/70-88.  She has had one reading of 140 SBP in the last 2 weeks.  She has noted that with blood pressure readings ranging 112-120 she has lightheadedness.  She also reports anxiety, she tells me that when she wakes up she immediately starts to feel anxious.  Her anxiety has never formally been addressed, she feels anxious when driving, in new places, around new people.  She likely has a component of whitecoat syndrome.  Her repeat blood pressure today is 142/90, her home blood pressure cuff reads 152/100.  She does admit that she is quite anxious meeting a new provider today and begin a new clinic.  She also had to drive herself to this appointment on her own which is particularly anxiety provoking.  She denies chest pain, shortness of breath, change in vision, headaches.  She is due for labs.

## 2018-05-30 NOTE — ASSESSMENT & PLAN NOTE
Patient is a 47-year-old female with anxiety that affects her ability to perform ADLs, it also affects her work.  She is specifically her anxiety is exacerbated by meeting new people, being in new places, driving.  She does have rare episodes of panic attacks where she feels like she cannot breathe or swallow, she was prescribed Lorazepam 0.5 mg #15 tablets by her PCP in September, she has not used any of these tablets.  Her blood pressure is slightly elevated in the office today at 142/90, she took lisinopril 2.5 mg last night, she admits that she is very anxious about being in a new place and meeting a new provider today.  She also has trouble falling asleep at night due to her anxiety.

## 2018-06-13 ENCOUNTER — OFFICE VISIT (OUTPATIENT)
Dept: MEDICAL GROUP | Facility: PHYSICIAN GROUP | Age: 48
End: 2018-06-13
Payer: COMMERCIAL

## 2018-06-13 VITALS
SYSTOLIC BLOOD PRESSURE: 142 MMHG | WEIGHT: 178.6 LBS | TEMPERATURE: 98.6 F | HEIGHT: 67 IN | DIASTOLIC BLOOD PRESSURE: 78 MMHG | HEART RATE: 85 BPM | BODY MASS INDEX: 28.03 KG/M2 | OXYGEN SATURATION: 95 % | RESPIRATION RATE: 16 BRPM

## 2018-06-13 DIAGNOSIS — I10 ESSENTIAL HYPERTENSION: ICD-10-CM

## 2018-06-13 DIAGNOSIS — F41.1 GENERALIZED ANXIETY DISORDER: ICD-10-CM

## 2018-06-13 PROCEDURE — 99214 OFFICE O/P EST MOD 30 MIN: CPT | Performed by: NURSE PRACTITIONER

## 2018-06-13 RX ORDER — METOPROLOL SUCCINATE 25 MG/1
25 TABLET, EXTENDED RELEASE ORAL DAILY
Qty: 30 TAB | Refills: 0 | Status: SHIPPED | OUTPATIENT
Start: 2018-06-13 | End: 2018-07-08 | Stop reason: SDUPTHER

## 2018-06-13 NOTE — PROGRESS NOTES
[facility]  Primary Care Office Visit - Problem-Oriented        History:     Penny Todd is a 47 y.o. female who is here today to discuss Hypertension (FV)      Essential hypertension  Patient is a 47-year-old female who is here for follow-up of hypertension.  She is no longer taking lisinopril, she felt that this was causing extreme drops in her blood pressure in the evenings, she does document readings as low as 100 systolic.  She brings her wrist cuff in today, reading is 154/99, when compared with wall-mounted reading is 150/90.  She does feel that her anxiety is very well controlled on Celexa 10 mg daily.  Home readings range from 130 to mid 150 systolic.  We did discuss that she does have hypertension and I do recommend resuming an antihypertensive today, she does disclose rare episodes of palpitations specifically with heightened anxiety, we will start her on metoprolol. She is due for labs.     Generalized anxiety disorder  Patient is a 47-year-old female with generalized anxiety disorder.  She continues on Celexa 10 mg daily, she has been compliant with his medication for 3 weeks.  She does report that her anxiety has improved, she is better able to perform at work though she does still have good and bad days, she works as an  for [employer].         Past Medical History:   Diagnosis Date   • Allergy    • Anxiety    • Arrhythmia     Sounds like PVC   • Asthma    • Clotting disorder (HCC)    • Thyroid disease     was hyperthyroid and had radiation to thyroid     History reviewed. No pertinent surgical history.  Social History     Social History   • Marital status:      Spouse name: N/A   • Number of children: N/A   • Years of education: N/A     Occupational History   • Not on file.     Social History Main Topics   • Smoking status: Never Smoker   • Smokeless tobacco: Never Used   • Alcohol use No   • Drug use: No   • Sexual activity: Yes     Partners: Male     Birth control/  protection: Pill     Other Topics Concern   • Not on file     Social History Narrative   • No narrative on file     History   Smoking Status   • Never Smoker   Smokeless Tobacco   • Never Used     Family History   Problem Relation Age of Onset   • Heart Disease Father    • Alcohol/Drug Father    • Hyperlipidemia Father    • Hypertension Father      Allergies   Allergen Reactions   • Bactrim Ds    • Sulfa Drugs        Problem List:     Patient Active Problem List    Diagnosis Date Noted   • Ganglion, right wrist 05/30/2018   • Perimenopause 05/30/2018   • Generalized anxiety disorder 09/18/2017   • Acquired hypothyroidism 08/18/2017   • Lupus anticoagulant syndrome (HCC) 08/18/2017   • Essential hypertension 08/18/2017   • Mild intermittent asthma without complication 08/18/2017   • Esophageal spasm 08/18/2017   • Deformity of toenail 08/18/2017   • Vitamin D deficiency 08/18/2017   • Abnormal renal function 08/18/2017         Medications:     Current Outpatient Prescriptions:   •  metoprolol SR (TOPROL XL) 25 MG TABLET SR 24 HR, Take 1 Tab by mouth every day., Disp: 30 Tab, Rfl: 0  •  citalopram (CELEXA) 10 MG tablet, Take 1 Tab by mouth every day., Disp: 30 Tab, Rfl: 2  •  levothyroxine (SYNTHROID) 88 MCG Tab, Take 1 Tab by mouth Every morning on an empty stomach., Disp: 90 Tab, Rfl: 1  •  JENCYCLA 0.35 MG tablet, , Disp: , Rfl:   •  Pseudoeph-Doxylamine-DM-APAP (NYQUIL PO), Take  by mouth., Disp: , Rfl:   •  Multiple Vitamins-Minerals (CENTRUM ADULTS PO), Take  by mouth., Disp: , Rfl:   •  prometh-phenylephrine-codeine (PHENERGAN VC CODEINE) 5-6.25-10 MG/5ML Syrup, Take 5 mL by mouth every 8 hours as needed., Disp: 70 mL, Rfl: 0  •  albuterol (VENTOLIN OR PROVENTIL) 108 (90 BASE) MCG/ACT Aero Soln inhalation aerosol, Inhale 2 Puffs by mouth every 6 hours as needed for Shortness of Breath., Disp: 8.5 g, Rfl: 1  •  lorazepam (ATIVAN) 0.5 MG Tab, Take 1 Tab by mouth 1 time daily as needed for Anxiety., Disp: 15 Tab,  "Rfl: 0      Review of Systems:     Pertinent positives as per HPI, all other systems reviewed and WNL     Physical Assessment:     VS: /78   Pulse 85   Temp 37 °C (98.6 °F)   Resp 16   Ht 1.702 m (5' 7.01\")   Wt 81 kg (178 lb 9.6 oz)   SpO2 95%   Breastfeeding? No   BMI 27.97 kg/m²     General: Well-developed, well-nourished, female     Head: PERRL, EOMI. Normocephalic. No facial asymmetry noted.  Cardiovasc: RRR, no MRG. No thrills or bruits. Pulses 2+ and symmetric at all distal extremities.  Pulmonary: Lungs clear bilaterally.  Normal respiratory effort. No wheeze or crackles.   Neuro: Alert and oriented, CNs II-XII intact, no focal deficits.  Skin:No rashes noted. Skin warm, dry, intact    Psych: Dressed appropriately for the weather, pleasant and conversant.  Affect, mood & judgment appropriate.    Assessment/Plan:   Penny was seen today for hypertension.    Diagnoses and all orders for this visit:    Essential hypertension, uncontrolled   -Given uncontrolled hypertension and concomitant palpitations, start low dose metoprolol daily, check labs and follow-up in 1 month, continue to monitor BP daily.   -     metoprolol SR (TOPROL XL) 25 MG TABLET SR 24 HR; Take 1 Tab by mouth every day.  -     COMP METABOLIC PANEL; Future    Generalized anxiety disorder, improving   -Continue Celexa 10 mg daily, we did discuss signs and symptoms that are suggestive of the need to increase to 20 mg daily.  She will follow-up in one month, sooner if needed.          Patient is agreeable to the above plan and voiced understanding. All questions answered.     Please note that this dictation was created using voice recognition software. I have made every reasonable attempt to correct obvious errors, but I expect that there are errors of grammar and possibly content that I did not discover before finalizing the note.      LULU Peterson  6/13/2018, 4:17 PM  "

## 2018-06-13 NOTE — ASSESSMENT & PLAN NOTE
Patient is a 47-year-old female with generalized anxiety disorder.  She continues on Celexa 10 mg daily, she has been compliant with his medication for 3 weeks.  She does report that her anxiety has improved, she is better able to perform at work though she does still have good and bad days, she works as an  for the [employer].

## 2018-06-13 NOTE — ASSESSMENT & PLAN NOTE
Patient is a 47-year-old female who is here for follow-up of hypertension.  She is no longer taking lisinopril, she felt that this was causing extreme drops in her blood pressure in the evenings, she does document readings as low as 100 systolic.  She brings her wrist cuff in today, reading is 154/99, when compared with wall-mounted reading is 150/90.  She does feel that her anxiety is very well controlled on Celexa 10 mg daily.  Home readings range from 130 to mid 150 systolic.  We did discuss that she does have hypertension and I do recommend resuming an antihypertensive today, she does disclose rare episodes of palpitations specifically with heightened anxiety, we will start her on metoprolol. She is due for labs.

## 2018-07-08 DIAGNOSIS — I10 ESSENTIAL HYPERTENSION: ICD-10-CM

## 2018-07-09 ENCOUNTER — HOSPITAL ENCOUNTER (OUTPATIENT)
Dept: LAB | Facility: MEDICAL CENTER | Age: 48
End: 2018-07-09
Attending: NURSE PRACTITIONER
Payer: COMMERCIAL

## 2018-07-09 DIAGNOSIS — I10 ESSENTIAL HYPERTENSION: ICD-10-CM

## 2018-07-09 PROCEDURE — 80053 COMPREHEN METABOLIC PANEL: CPT

## 2018-07-09 PROCEDURE — 36415 COLL VENOUS BLD VENIPUNCTURE: CPT

## 2018-07-09 NOTE — TELEPHONE ENCOUNTER
Was the patient seen in the last year in this department? Yes     Does patient have an active prescription for medications requested? No     Received Request Via: Pharmacy      Pt met protocol?: Yes, OV last month   BP Readings from Last 1 Encounters:   06/13/18 142/78

## 2018-07-10 LAB
ALBUMIN SERPL BCP-MCNC: 3.5 G/DL (ref 3.2–4.9)
ALBUMIN/GLOB SERPL: 0.8 G/DL
ALP SERPL-CCNC: 58 U/L (ref 30–99)
ALT SERPL-CCNC: <5 U/L (ref 2–50)
ANION GAP SERPL CALC-SCNC: 9 MMOL/L (ref 0–11.9)
AST SERPL-CCNC: 17 U/L (ref 12–45)
BILIRUB SERPL-MCNC: 0.3 MG/DL (ref 0.1–1.5)
BUN SERPL-MCNC: 15 MG/DL (ref 8–22)
CALCIUM SERPL-MCNC: 9.6 MG/DL (ref 8.5–10.5)
CHLORIDE SERPL-SCNC: 105 MMOL/L (ref 96–112)
CO2 SERPL-SCNC: 22 MMOL/L (ref 20–33)
CREAT SERPL-MCNC: 0.78 MG/DL (ref 0.5–1.4)
GLOBULIN SER CALC-MCNC: 4.2 G/DL (ref 1.9–3.5)
GLUCOSE SERPL-MCNC: 80 MG/DL (ref 65–99)
POTASSIUM SERPL-SCNC: 4.2 MMOL/L (ref 3.6–5.5)
PROT SERPL-MCNC: 7.7 G/DL (ref 6–8.2)
SODIUM SERPL-SCNC: 136 MMOL/L (ref 135–145)

## 2018-07-10 RX ORDER — METOPROLOL SUCCINATE 25 MG/1
TABLET, EXTENDED RELEASE ORAL
Qty: 30 TAB | Refills: 0 | Status: SHIPPED | OUTPATIENT
Start: 2018-07-10 | End: 2018-07-16

## 2018-07-16 ENCOUNTER — OFFICE VISIT (OUTPATIENT)
Dept: MEDICAL GROUP | Facility: PHYSICIAN GROUP | Age: 48
End: 2018-07-16
Payer: COMMERCIAL

## 2018-07-16 VITALS
HEART RATE: 87 BPM | HEIGHT: 67 IN | RESPIRATION RATE: 14 BRPM | BODY MASS INDEX: 28.33 KG/M2 | DIASTOLIC BLOOD PRESSURE: 90 MMHG | TEMPERATURE: 97.2 F | WEIGHT: 180.5 LBS | SYSTOLIC BLOOD PRESSURE: 142 MMHG | OXYGEN SATURATION: 97 %

## 2018-07-16 DIAGNOSIS — I10 ESSENTIAL HYPERTENSION: ICD-10-CM

## 2018-07-16 DIAGNOSIS — E03.9 ACQUIRED HYPOTHYROIDISM: ICD-10-CM

## 2018-07-16 DIAGNOSIS — R00.2 PALPITATIONS: ICD-10-CM

## 2018-07-16 DIAGNOSIS — F41.1 GAD (GENERALIZED ANXIETY DISORDER): ICD-10-CM

## 2018-07-16 DIAGNOSIS — L60.8 DEFORMITY OF TOENAIL: ICD-10-CM
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
## 2018-07-16 DIAGNOSIS — E55.9 VITAMIN D DEFICIENCY: ICD-10-CM

## 2018-07-16 DIAGNOSIS — Z13.220 SCREENING FOR LIPID DISORDERS: ICD-10-CM

## 2018-07-16 DIAGNOSIS — F41.1 GENERALIZED ANXIETY DISORDER: ICD-10-CM

## 2018-07-16 PROBLEM — N28.9 ABNORMAL RENAL FUNCTION: Status: RESOLVED | Noted: 2017-08-18 | Resolved: 2018-07-16

## 2018-07-16 PROCEDURE — 99214 OFFICE O/P EST MOD 30 MIN: CPT | Performed by: NURSE PRACTITIONER

## 2018-07-16 RX ORDER — METOPROLOL SUCCINATE 50 MG/1
50 TABLET, EXTENDED RELEASE ORAL DAILY
Qty: 90 TAB | Refills: 1 | Status: SHIPPED | OUTPATIENT
Start: 2018-07-16 | End: 2019-01-10 | Stop reason: SDUPTHER

## 2018-07-16 RX ORDER — CITALOPRAM HYDROBROMIDE 10 MG/1
10 TABLET ORAL DAILY
Qty: 90 TAB | Refills: 1 | Status: SHIPPED | OUTPATIENT
Start: 2018-07-16 | End: 2019-01-17

## 2018-07-16 NOTE — ASSESSMENT & PLAN NOTE
Patient is under the care of podiatry, Dr. Stahl.  She is currently being treated for onychomycosis of the left great toe.  Apparently allergic to oral terbinafine, causing a rash?  She does not actually recall the name though she does tell me that the podiatrist recommended oral treatment for toenail fungus for 12 weeks, I suspect she is describing terbinafine.  Nevertheless, she is being treated with tolnaftate 1% topical without improvement in the yellowing and thickness of the toenail.  She tells me that the podiatrist is recommending toenail removal and would like a second opinion.

## 2018-07-16 NOTE — ASSESSMENT & PLAN NOTE
"Patient is a 48-year-old female with hypertension.  She continues on metoprolol 25 mg extended release once daily, lisinopril is discontinued as she did not like \"how I felt \"on the medication and also having labile blood pressure readings.  Started on metoprolol as she was also having intermittent palpitations with episodes of anxiety.  Palpitations have significantly improved.  Also, patient does have a history of mild intermittent asthma, she has not used her inhaler in many months, I do feel comfortable continuing beta-blocker in the setting of reactive airway, we will continue to monitor for bronchospasm.  Unfortunately blood pressure remains uncontrolled.  She does have home readings which she is taking daily, averages about mid 130s systolic.  She denies chest pain, shortness of breath, change in vision, headaches.  CMP is reviewed and normal.  "

## 2018-07-17 NOTE — ASSESSMENT & PLAN NOTE
"Patient is a 48-year-old female with intermittent palpitations related to anxiety.  At the last office visit we discontinued her lisinopril as she was having some labile blood pressure readings and overall did not feel \"well\" on this medication.  She was started on metoprolol 25 mg extended release and has not had an episode of palpitations since beginning this medication.  "

## 2018-07-17 NOTE — ASSESSMENT & PLAN NOTE
Patient is a 48-year-old female with generalized anxiety disorder, she continues on Celexa 10 mg daily.  She reports that her anxiety is improved, she is better able to manage day-to-day stressors.  She does continue to obsess and worry over her daughter, did she make it to school okay? Is she raising her right? etc.  She works as an  for the [employer] and feels that she is better able to manage work life balance on the medication.

## 2018-07-17 NOTE — PROGRESS NOTES
"[facility]  Primary Care Office Visit - Problem-Oriented        History:     Penny Todd is a 48 y.o. female who is here today to discuss Hypertension (Fv)      Essential hypertension  Patient is a 48-year-old female with hypertension.  She continues on metoprolol 25 mg extended release once daily, lisinopril is discontinued as she did not like \"how I felt \"on the medication and also having labile blood pressure readings.  Started on metoprolol as she was also having intermittent palpitations with episodes of anxiety.  Palpitations have significantly improved.  Also, patient does have a history of mild intermittent asthma, she has not used her inhaler in many months, I do feel comfortable continuing beta-blocker in the setting of reactive airway, we will continue to monitor for bronchospasm.  Unfortunately blood pressure remains uncontrolled.  She does have home readings which she is taking daily, averages about mid 130s systolic.  She denies chest pain, shortness of breath, change in vision, headaches.  CMP is reviewed and normal.    Deformity of toenail  Patient is under the care of podiatry, Dr. Stahl.  She is currently being treated for onychomycosis of the left great toe.  Apparently allergic to oral terbinafine, causing a rash?  She does not actually recall the name though she does tell me that the podiatrist recommended oral treatment for toenail fungus for 12 weeks, I suspect she is describing terbinafine.  Nevertheless, she is being treated with tolnaftate 1% topical without improvement in the yellowing and thickness of the toenail.  She tells me that the podiatrist is recommending toenail removal and would like a second opinion.      Generalized anxiety disorder  Patient is a 48-year-old female with generalized anxiety disorder, she continues on Celexa 10 mg daily.  She reports that her anxiety is improved, she is better able to manage day-to-day stressors.  She does continue to obsess and " "worry over her daughter, did she make it to school okay? Is she raising her right? etc.  She works as an  for the [employer] and feels that she is better able to manage work life balance on the medication.     Palpitations  Patient is a 48-year-old female with intermittent palpitations related to anxiety.  At the last office visit we discontinued her lisinopril as she was having some labile blood pressure readings and overall did not feel \"well\" on this medication.  She was started on metoprolol 25 mg extended release and has not had an episode of palpitations since beginning this medication.    Acquired hypothyroidism  This is a chronic condition which is well controlled on medications. Patient is tolerating medications without side effects.      Cat scratch  She reports a cat scratch on the right anterior lower extremity.  It was initially itchy, she used some over-the-counter antihistamines with improvement.  She wonders if it could be infected.  She has not had any drainage from the site or fevers.        Past Medical History:   Diagnosis Date   • Allergy    • Anxiety    • Arrhythmia     Sounds like PVC   • Asthma    • Clotting disorder (HCC)    • Thyroid disease     was hyperthyroid and had radiation to thyroid     History reviewed. No pertinent surgical history.  Social History     Social History   • Marital status:      Spouse name: N/A   • Number of children: N/A   • Years of education: N/A     Occupational History   • Not on file.     Social History Main Topics   • Smoking status: Never Smoker   • Smokeless tobacco: Never Used   • Alcohol use No   • Drug use: No   • Sexual activity: Yes     Partners: Male     Birth control/ protection: Pill     Other Topics Concern   • Not on file     Social History Narrative   • No narrative on file     History   Smoking Status   • Never Smoker   Smokeless Tobacco   • Never Used     Family History   Problem Relation Age of Onset   • Heart Disease Father    • " "Alcohol/Drug Father    • Hyperlipidemia Father    • Hypertension Father      Allergies   Allergen Reactions   • Bactrim Ds    • Sulfa Drugs        Problem List:     Patient Active Problem List    Diagnosis Date Noted   • Palpitations 07/16/2018   • Cat scratch 07/16/2018   • Ganglion, right wrist 05/30/2018   • Perimenopause 05/30/2018   • Generalized anxiety disorder 09/18/2017   • Acquired hypothyroidism 08/18/2017   • Lupus anticoagulant syndrome (HCC) 08/18/2017   • Essential hypertension 08/18/2017   • Mild intermittent asthma without complication 08/18/2017   • Esophageal spasm 08/18/2017   • Deformity of toenail 08/18/2017   • Vitamin D deficiency 08/18/2017         Medications:     Current Outpatient Prescriptions:   •  metoprolol SR (TOPROL XL) 50 MG TABLET SR 24 HR, Take 1 Tab by mouth every day., Disp: 90 Tab, Rfl: 1  •  citalopram (CELEXA) 10 MG tablet, Take 1 Tab by mouth every day., Disp: 90 Tab, Rfl: 1  •  levothyroxine (SYNTHROID) 88 MCG Tab, Take 1 Tab by mouth Every morning on an empty stomach., Disp: 90 Tab, Rfl: 1  •  JENCYCLA 0.35 MG tablet, , Disp: , Rfl:   •  Pseudoeph-Doxylamine-DM-APAP (NYQUIL PO), Take  by mouth., Disp: , Rfl:   •  Multiple Vitamins-Minerals (CENTRUM ADULTS PO), Take  by mouth., Disp: , Rfl:   •  prometh-phenylephrine-codeine (PHENERGAN VC CODEINE) 5-6.25-10 MG/5ML Syrup, Take 5 mL by mouth every 8 hours as needed., Disp: 70 mL, Rfl: 0  •  albuterol (VENTOLIN OR PROVENTIL) 108 (90 BASE) MCG/ACT Aero Soln inhalation aerosol, Inhale 2 Puffs by mouth every 6 hours as needed for Shortness of Breath., Disp: 8.5 g, Rfl: 1  •  lorazepam (ATIVAN) 0.5 MG Tab, Take 1 Tab by mouth 1 time daily as needed for Anxiety., Disp: 15 Tab, Rfl: 0      Review of Systems:     Pertinent positives as per HPI, all other systems reviewed and WNL     Physical Assessment:     VS: /90   Pulse 87   Temp 36.2 °C (97.2 °F)   Resp 14   Ht 1.702 m (5' 7.01\")   Wt 81.9 kg (180 lb 8 oz)   SpO2 97% "   Breastfeeding? No   BMI 28.26 kg/m²     General: Well-developed, well-nourished, female     Head: PERRL, EOMI. Normocephalic. No facial asymmetry noted.  Cardiovasc:RRR, no MRG. No thrills or bruits. Pulses 2+ and symmetric at all distal extremities.  Pulmonary: Lungs clear bilaterally.  Normal respiratory effort. No wheeze or crackles.   Extremities: LEFT GREAT TOENAIL THICKENED AND YELLOW. No edema, no TTP bilateral calves. Pedal pulses intact. No joint effusions. LEs warm and well-perfused.  Neuro: Alert and oriented, CNs II-XII intact, no focal deficits.  Skin:No rashes noted. Skin warm, dry, intact.  Small abrasion with minimal surrounding erythema noted on right anterior calf.  No drainage, no warmth.  Psych: Dressed appropriately for the weather, pleasant and conversant.  Affect, mood & judgment appropriate.      Recent Labs & Imaging:     Lab Results   Component Value Date/Time    SODIUM 136 07/09/2018 09:34 AM    POTASSIUM 4.2 07/09/2018 09:34 AM    CHLORIDE 105 07/09/2018 09:34 AM    CO2 22 07/09/2018 09:34 AM    GLUCOSE 80 07/09/2018 09:34 AM    BUN 15 07/09/2018 09:34 AM    CREATININE 0.78 07/09/2018 09:34 AM      Reviewed labs   TOBY-7 score 7     Assessment/Plan:   Penny was seen today for hypertension.    Diagnoses and all orders for this visit:    Essential hypertension, uncontrolled  -Increase metoprolol SR to 50 mg daily, continue to monitor blood pressure readings, labs reviewed and are normal, follow-up in 6 months, sooner if needed or for BP >150/100.   -     COMP METABOLIC PANEL; Future  -     CBC WITH DIFFERENTIAL; Future  -     metoprolol SR (TOPROL XL) 50 MG TABLET SR 24 HR; Take 1 Tab by mouth every day.    Palpitations, well controlled on present treatment that we will be increasing metoprolol to 50 mg daily in the setting of uncontrolled hypertension  -     metoprolol SR (TOPROL XL) 50 MG TABLET SR 24 HR; Take 1 Tab by mouth every day.    Screening for lipid disorders  -     LIPID  PROFILE; Future    Deformity of toenail, uncontrolled, defer back to podiatry    Cat scratch, stable, reassurance, monitor for s/sx of infection     TOBY (generalized anxiety disorder), controlled   -Continue current treatment, continue to monitor, follow-up in 6 months  -     citalopram (CELEXA) 10 MG tablet; Take 1 Tab by mouth every day.    Vitamin D deficiency, unclear control, check labs  -     VITAMIN D,25 HYDROXY; Future    Acquired hypothyroidism, chronic, well controlled, continue present treatment, monitor labs annually       Patient is agreeable to the above plan and voiced understanding. All questions answered.     Please note that this dictation was created using voice recognition software. I have made every reasonable attempt to correct obvious errors, but I expect that there are errors of grammar and possibly content that I did not discover before finalizing the note.      LULU Peterson  7/17/2018, 8:13 AM

## 2018-07-17 NOTE — ASSESSMENT & PLAN NOTE
She reports a cat scratch on the right anterior lower extremity.  It was initially itchy, she used some over-the-counter antihistamines with improvement.  She wonders if it could be infected.  She has not had any drainage from the site or fevers.

## 2018-10-13 DIAGNOSIS — E03.9 ACQUIRED HYPOTHYROIDISM: ICD-10-CM

## 2018-10-15 RX ORDER — LEVOTHYROXINE SODIUM 88 UG/1
TABLET ORAL
Qty: 90 TAB | Refills: 1 | Status: SHIPPED | OUTPATIENT
Start: 2018-10-15 | End: 2019-03-05 | Stop reason: SDUPTHER

## 2019-01-10 ENCOUNTER — HOSPITAL ENCOUNTER (OUTPATIENT)
Dept: LAB | Facility: MEDICAL CENTER | Age: 49
End: 2019-01-10
Attending: NURSE PRACTITIONER
Payer: COMMERCIAL

## 2019-01-10 DIAGNOSIS — Z13.220 SCREENING FOR LIPID DISORDERS: ICD-10-CM

## 2019-01-10 DIAGNOSIS — I10 ESSENTIAL HYPERTENSION: ICD-10-CM

## 2019-01-10 DIAGNOSIS — R00.2 PALPITATIONS: ICD-10-CM

## 2019-01-10 DIAGNOSIS — E55.9 VITAMIN D DEFICIENCY: ICD-10-CM

## 2019-01-10 LAB
25(OH)D3 SERPL-MCNC: 46 NG/ML (ref 30–100)
ALBUMIN SERPL BCP-MCNC: 4.5 G/DL (ref 3.2–4.9)
ALBUMIN/GLOB SERPL: 1.2 G/DL
ALP SERPL-CCNC: 82 U/L (ref 30–99)
ALT SERPL-CCNC: 17 U/L (ref 2–50)
ANION GAP SERPL CALC-SCNC: 7 MMOL/L (ref 0–11.9)
AST SERPL-CCNC: 22 U/L (ref 12–45)
BASOPHILS # BLD AUTO: 0.6 % (ref 0–1.8)
BASOPHILS # BLD: 0.04 K/UL (ref 0–0.12)
BILIRUB SERPL-MCNC: 0.7 MG/DL (ref 0.1–1.5)
BUN SERPL-MCNC: 16 MG/DL (ref 8–22)
CALCIUM SERPL-MCNC: 9.8 MG/DL (ref 8.5–10.5)
CHLORIDE SERPL-SCNC: 104 MMOL/L (ref 96–112)
CHOLEST SERPL-MCNC: 155 MG/DL (ref 100–199)
CO2 SERPL-SCNC: 26 MMOL/L (ref 20–33)
CREAT SERPL-MCNC: 0.83 MG/DL (ref 0.5–1.4)
EOSINOPHIL # BLD AUTO: 0.12 K/UL (ref 0–0.51)
EOSINOPHIL NFR BLD: 1.9 % (ref 0–6.9)
ERYTHROCYTE [DISTWIDTH] IN BLOOD BY AUTOMATED COUNT: 38.5 FL (ref 35.9–50)
FASTING STATUS PATIENT QL REPORTED: NORMAL
GLOBULIN SER CALC-MCNC: 3.7 G/DL (ref 1.9–3.5)
GLUCOSE SERPL-MCNC: 92 MG/DL (ref 65–99)
HCT VFR BLD AUTO: 44.1 % (ref 37–47)
HDLC SERPL-MCNC: 38 MG/DL
HGB BLD-MCNC: 15.1 G/DL (ref 12–16)
IMM GRANULOCYTES # BLD AUTO: 0.02 K/UL (ref 0–0.11)
IMM GRANULOCYTES NFR BLD AUTO: 0.3 % (ref 0–0.9)
LDLC SERPL CALC-MCNC: 100 MG/DL
LYMPHOCYTES # BLD AUTO: 1.81 K/UL (ref 1–4.8)
LYMPHOCYTES NFR BLD: 29.1 % (ref 22–41)
MCH RBC QN AUTO: 30.1 PG (ref 27–33)
MCHC RBC AUTO-ENTMCNC: 34.2 G/DL (ref 33.6–35)
MCV RBC AUTO: 87.8 FL (ref 81.4–97.8)
MONOCYTES # BLD AUTO: 0.4 K/UL (ref 0–0.85)
MONOCYTES NFR BLD AUTO: 6.4 % (ref 0–13.4)
NEUTROPHILS # BLD AUTO: 3.83 K/UL (ref 2–7.15)
NEUTROPHILS NFR BLD: 61.7 % (ref 44–72)
NRBC # BLD AUTO: 0 K/UL
NRBC BLD-RTO: 0 /100 WBC
PLATELET # BLD AUTO: 261 K/UL (ref 164–446)
PMV BLD AUTO: 10 FL (ref 9–12.9)
POTASSIUM SERPL-SCNC: 4.3 MMOL/L (ref 3.6–5.5)
PROT SERPL-MCNC: 8.2 G/DL (ref 6–8.2)
RBC # BLD AUTO: 5.02 M/UL (ref 4.2–5.4)
SODIUM SERPL-SCNC: 137 MMOL/L (ref 135–145)
TRIGL SERPL-MCNC: 84 MG/DL (ref 0–149)
WBC # BLD AUTO: 6.2 K/UL (ref 4.8–10.8)

## 2019-01-10 PROCEDURE — 80053 COMPREHEN METABOLIC PANEL: CPT

## 2019-01-10 PROCEDURE — 80061 LIPID PANEL: CPT

## 2019-01-10 PROCEDURE — 36415 COLL VENOUS BLD VENIPUNCTURE: CPT

## 2019-01-10 PROCEDURE — 85025 COMPLETE CBC W/AUTO DIFF WBC: CPT

## 2019-01-10 PROCEDURE — 82306 VITAMIN D 25 HYDROXY: CPT

## 2019-01-15 RX ORDER — METOPROLOL SUCCINATE 50 MG/1
TABLET, EXTENDED RELEASE ORAL
Qty: 90 TAB | Refills: 1 | Status: SHIPPED | OUTPATIENT
Start: 2019-01-15 | End: 2019-01-17

## 2019-01-17 ENCOUNTER — OFFICE VISIT (OUTPATIENT)
Dept: MEDICAL GROUP | Facility: PHYSICIAN GROUP | Age: 49
End: 2019-01-17
Payer: COMMERCIAL

## 2019-01-17 VITALS
BODY MASS INDEX: 26.53 KG/M2 | WEIGHT: 169 LBS | SYSTOLIC BLOOD PRESSURE: 140 MMHG | HEIGHT: 67 IN | TEMPERATURE: 98.4 F | OXYGEN SATURATION: 97 % | DIASTOLIC BLOOD PRESSURE: 80 MMHG | HEART RATE: 87 BPM

## 2019-01-17 DIAGNOSIS — F41.1 GAD (GENERALIZED ANXIETY DISORDER): ICD-10-CM

## 2019-01-17 DIAGNOSIS — R45.86 MOOD SWINGS: ICD-10-CM

## 2019-01-17 DIAGNOSIS — E03.9 ACQUIRED HYPOTHYROIDISM: ICD-10-CM

## 2019-01-17 DIAGNOSIS — F41.1 GENERALIZED ANXIETY DISORDER: ICD-10-CM

## 2019-01-17 DIAGNOSIS — I10 ESSENTIAL HYPERTENSION: ICD-10-CM

## 2019-01-17 PROCEDURE — 99214 OFFICE O/P EST MOD 30 MIN: CPT | Performed by: NURSE PRACTITIONER

## 2019-01-17 RX ORDER — METOPROLOL SUCCINATE 25 MG/1
25 TABLET, EXTENDED RELEASE ORAL DAILY
Qty: 30 TAB | Refills: 5 | Status: SHIPPED | OUTPATIENT
Start: 2019-01-17 | End: 2019-03-05 | Stop reason: SDUPTHER

## 2019-01-17 RX ORDER — CITALOPRAM 20 MG/1
20 TABLET ORAL DAILY
Qty: 30 TAB | Refills: 5 | Status: SHIPPED | OUTPATIENT
Start: 2019-01-17 | End: 2019-03-05 | Stop reason: SDUPTHER

## 2019-01-17 ASSESSMENT — PATIENT HEALTH QUESTIONNAIRE - PHQ9: CLINICAL INTERPRETATION OF PHQ2 SCORE: 0

## 2019-01-17 NOTE — PROGRESS NOTES
[place] [facility]  Primary Care Office Visit - Problem-Oriented        History:     Penny oTdd is a 48 y.o. female who is here today to discuss No chief complaint on file.      Essential hypertension  Patient is a 48-year-old female who is here for follow-up.  She does have whitecoat hypertension, currently on metoprolol 25 mg extended release once daily.  Metoprolol remains in the setting of palpitations provoked by anxiety.  Lisinopril has been discontinued due to hypotensive episodes.  Blood pressures have been well controlled at home, generally 115-125 systolic.  She does have some elevated readings in the mid 140s but she does note that these are following conversations with her superiors, she is extremely anxious and irritable at the time of reading.  She denies chest pain, shortness of breath, change in vision, headaches.    Generalized anxiety disorder  Patient is a 48-year-old female with generalized anxiety disorder, mood swings and panic.  She continues on Celexa 10 mg daily but does report more irritability over the last few months.  She wonders if she would benefit from an increased dose.        Past Medical History:   Diagnosis Date   • Allergy    • Anxiety    • Arrhythmia     Sounds like PVC   • Asthma    • Clotting disorder (HCC)    • Thyroid disease     was hyperthyroid and had radiation to thyroid     No past surgical history on file.  Social History     Social History   • Marital status:      Spouse name: N/A   • Number of children: N/A   • Years of education: N/A     Occupational History   • Not on file.     Social History Main Topics   • Smoking status: Never Smoker   • Smokeless tobacco: Never Used   • Alcohol use No   • Drug use: No   • Sexual activity: Yes     Partners: Male     Birth control/ protection: Pill     Other Topics Concern   • Not on file     Social History Narrative   • No narrative on file     History   Smoking Status   • Never Smoker   Smokeless Tobacco   •  Never Used     Family History   Problem Relation Age of Onset   • Heart Disease Father    • Alcohol/Drug Father    • Hyperlipidemia Father    • Hypertension Father      Allergies   Allergen Reactions   • Bactrim Ds    • Carrot [Daucus Carota]    • Food      Cilantro, pecans, walnuts, carrots, bananas, cantaloupe   • Sulfa Drugs        Problem List:     Patient Active Problem List    Diagnosis Date Noted   • Palpitations 07/16/2018   • Cat scratch 07/16/2018   • Ganglion, right wrist 05/30/2018   • Perimenopause 05/30/2018   • Generalized anxiety disorder 09/18/2017   • Acquired hypothyroidism 08/18/2017   • Lupus anticoagulant syndrome (HCC) 08/18/2017   • Essential hypertension 08/18/2017   • Mild intermittent asthma without complication 08/18/2017   • Esophageal spasm 08/18/2017   • Deformity of toenail 08/18/2017   • Vitamin D deficiency 08/18/2017         Medications:     Current Outpatient Prescriptions:   •  metoprolol SR (TOPROL XL) 25 MG TABLET SR 24 HR, Take 1 Tab by mouth every day., Disp: 30 Tab, Rfl: 5  •  citalopram (CELEXA) 20 MG Tab, Take 1 Tab by mouth every day., Disp: 30 Tab, Rfl: 5  •  levothyroxine (SYNTHROID) 88 MCG Tab, TAKE 1 TABLET BY MOUTH ONCE DAILY IN THE MORNING ON  AN  EMPTY  STOMACH, Disp: 90 Tab, Rfl: 1  •  JENCYCLA 0.35 MG tablet, , Disp: , Rfl:   •  Multiple Vitamins-Minerals (CENTRUM ADULTS PO), Take  by mouth., Disp: , Rfl:   •  lorazepam (ATIVAN) 0.5 MG Tab, Take 1 Tab by mouth 1 time daily as needed for Anxiety., Disp: 15 Tab, Rfl: 0  •  Pseudoeph-Doxylamine-DM-APAP (NYQUIL PO), Take  by mouth., Disp: , Rfl:   •  prometh-phenylephrine-codeine (PHENERGAN VC CODEINE) 5-6.25-10 MG/5ML Syrup, Take 5 mL by mouth every 8 hours as needed. (Patient not taking: Reported on 1/17/2019), Disp: 70 mL, Rfl: 0  •  albuterol (VENTOLIN OR PROVENTIL) 108 (90 BASE) MCG/ACT Aero Soln inhalation aerosol, Inhale 2 Puffs by mouth every 6 hours as needed for Shortness of Breath., Disp: 8.5 g, Rfl:  "1      Review of Systems:     Pertinent positives as per HPI, all other systems reviewed and WNL     Physical Assessment:     VS: /80 (BP Location: Right arm, Patient Position: Sitting)   Pulse 87   Temp 36.9 °C (98.4 °F) (Temporal)   Ht 1.702 m (5' 7\")   Wt 76.7 kg (169 lb)   SpO2 97%   BMI 26.47 kg/m²     General: Well-developed, well-nourished, female     Head: PERRL, EOMI. Normocephalic. No facial asymmetry noted.  Cardiovasc:RRR, no MRG. No thrills or bruits. Pulses 2+ and symmetric at all distal extremities.  Pulmonary: Lungs clear bilaterally.  Normal respiratory effort. No wheeze or crackles.   Neuro: Alert and oriented, CNs II-XII intact, no focal deficits.  Skin:No rashes noted. Skin warm, dry, intact    Psych: Dressed appropriately for the weather, pleasant and conversant.  Affect, mood & judgment appropriate.      Assessment/Plan:   Diagnoses and all orders for this visit:    Essential hypertension, chronic, stable   - elevations in office 2/2 white coat syndrome, home readings have been great with fluctuations following anxiety/panic episodes.  No changes to treatment at this time.  Medication refilled.  Follow-up in 6 weeks, sooner if needed.  -     metoprolol SR (TOPROL XL) 25 MG TABLET SR 24 HR; Take 1 Tab by mouth every day.    TOBY (generalized anxiety disorder), uncontrolled  - trial increase dose of celexa to 20mg daily, follow up in 6 weeks.   -     citalopram (CELEXA) 20 MG Tab; Take 1 Tab by mouth every day.    Acquired hypothyroidism, unclear control, continue current treatment, monitor labs and follow up in 6 weeks.   -     TSH; Future  -     FREE THYROXINE; Future          Patient is agreeable to the above plan and voiced understanding. All questions answered.     Please note that this dictation was created using voice recognition software. I have made every reasonable attempt to correct obvious errors, but I expect that there are errors of grammar and possibly content that I did " not discover before finalizing the note.      LULU Peterson  1/17/2019, 3:12 PM

## 2019-01-17 NOTE — ASSESSMENT & PLAN NOTE
Patient is a 48-year-old female who is here for follow-up.  She does have whitecoat hypertension, currently on metoprolol 25 mg extended release once daily.  Metoprolol remains in the setting of palpitations provoked by anxiety.  Lisinopril has been discontinued due to hypotensive episodes.  Blood pressures have been well controlled at home, generally 115-125 systolic.  She does have some elevated readings in the mid 140s but she does note that these are following conversations with her superiors, she is extremely anxious and irritable at the time of reading.  She denies chest pain, shortness of breath, change in vision, headaches.

## 2019-01-17 NOTE — ASSESSMENT & PLAN NOTE
Patient is a 48-year-old female with generalized anxiety disorder, mood swings and panic.  She continues on Celexa 10 mg daily but does report more irritability over the last few months.  She wonders if she would benefit from an increased dose.

## 2019-03-01 ENCOUNTER — HOSPITAL ENCOUNTER (OUTPATIENT)
Dept: LAB | Facility: MEDICAL CENTER | Age: 49
End: 2019-03-01
Attending: NURSE PRACTITIONER
Payer: COMMERCIAL

## 2019-03-01 DIAGNOSIS — E03.9 ACQUIRED HYPOTHYROIDISM: ICD-10-CM

## 2019-03-01 LAB
T4 FREE SERPL-MCNC: 1.18 NG/DL (ref 0.53–1.43)
TSH SERPL DL<=0.005 MIU/L-ACNC: 0.29 UIU/ML (ref 0.38–5.33)

## 2019-03-01 PROCEDURE — 84443 ASSAY THYROID STIM HORMONE: CPT

## 2019-03-01 PROCEDURE — 36415 COLL VENOUS BLD VENIPUNCTURE: CPT

## 2019-03-01 PROCEDURE — 84439 ASSAY OF FREE THYROXINE: CPT

## 2019-03-05 ENCOUNTER — OFFICE VISIT (OUTPATIENT)
Dept: MEDICAL GROUP | Facility: PHYSICIAN GROUP | Age: 49
End: 2019-03-05
Payer: COMMERCIAL

## 2019-03-05 VITALS
RESPIRATION RATE: 12 BRPM | TEMPERATURE: 97.6 F | SYSTOLIC BLOOD PRESSURE: 152 MMHG | WEIGHT: 167 LBS | BODY MASS INDEX: 26.21 KG/M2 | DIASTOLIC BLOOD PRESSURE: 80 MMHG | HEIGHT: 67 IN | OXYGEN SATURATION: 98 % | HEART RATE: 72 BPM

## 2019-03-05 DIAGNOSIS — E03.9 ACQUIRED HYPOTHYROIDISM: ICD-10-CM

## 2019-03-05 DIAGNOSIS — R00.2 PALPITATIONS: ICD-10-CM

## 2019-03-05 DIAGNOSIS — F41.1 GAD (GENERALIZED ANXIETY DISORDER): ICD-10-CM

## 2019-03-05 DIAGNOSIS — I10 ESSENTIAL HYPERTENSION: ICD-10-CM

## 2019-03-05 DIAGNOSIS — F41.1 GENERALIZED ANXIETY DISORDER: ICD-10-CM

## 2019-03-05 DIAGNOSIS — R45.86 MOOD SWINGS: ICD-10-CM

## 2019-03-05 PROCEDURE — 99214 OFFICE O/P EST MOD 30 MIN: CPT | Performed by: NURSE PRACTITIONER

## 2019-03-05 RX ORDER — CITALOPRAM HYDROBROMIDE 10 MG/1
20 TABLET ORAL DAILY
Qty: 90 TAB | Refills: 1 | Status: SHIPPED | OUTPATIENT
Start: 2019-03-05 | End: 2019-07-25 | Stop reason: SDUPTHER

## 2019-03-05 RX ORDER — LEVOTHYROXINE SODIUM 88 UG/1
TABLET ORAL
Qty: 90 TAB | Refills: 3 | Status: ON HOLD | OUTPATIENT
Start: 2019-03-05 | End: 2019-09-18

## 2019-03-05 RX ORDER — AMLODIPINE BESYLATE 2.5 MG/1
2.5 TABLET ORAL DAILY
Qty: 90 TAB | Refills: 1 | Status: SHIPPED | OUTPATIENT
Start: 2019-03-05 | End: 2019-07-25 | Stop reason: SDUPTHER

## 2019-03-05 RX ORDER — METOPROLOL SUCCINATE 25 MG/1
25 TABLET, EXTENDED RELEASE ORAL DAILY
Qty: 90 TAB | Refills: 1 | Status: SHIPPED | OUTPATIENT
Start: 2019-03-05 | End: 2019-09-06

## 2019-03-05 RX ORDER — CITALOPRAM HYDROBROMIDE 10 MG/1
20 TABLET ORAL DAILY
Qty: 30 TAB | Refills: 5 | Status: SHIPPED | OUTPATIENT
Start: 2019-03-05 | End: 2019-03-05 | Stop reason: SDUPTHER

## 2019-03-05 NOTE — ASSESSMENT & PLAN NOTE
Patient is a 48-year-old female with intermittent palpitations related to anxiety.  She does continue on metoprolol 25 mg daily, she has had 2 episodes of palpitations since I saw her last.  She wonders if she needs to be taking this medication daily.

## 2019-03-05 NOTE — ASSESSMENT & PLAN NOTE
Patient is a 48-year-old female who is here for follow-up.  She does have whitecoat hypertension and was started on metoprolol by her previous PCP for complaints of palpitations.  Her lisinopril was discontinued due to hypotensive episodes.  She checks her blood pressure at least once daily.  Her readings are quite variable from 120-140 systolic.  Her blood pressure today is 160/98 and 152/80 on the wall-mounted cuff, her wrist cuff measures in 130's systolic. She denies CP, SOB, HA, blurred vision.

## 2019-03-05 NOTE — PROGRESS NOTES
[facility]  Primary Care Office Visit - Problem-Oriented        History:     Penny Todd is a 48 y.o. female who is here today to discuss Hypertension (FV)      Generalized anxiety disorder  Patient is a 48-year-old female with generalized anxiety disorder.  She feels that her anxiety is very well controlled on Celexa, we did discuss a trial increase to Celexa 20 mg daily at our last office visit.  Since then she has had more fatigue and does not feel that this has significantly improved her anxiety, thus she would like to go back to 10 mg daily.    Palpitations  Patient is a 48-year-old female with intermittent palpitations related to anxiety.  She does continue on metoprolol 25 mg daily, she has had 2 episodes of palpitations since I saw her last.  She wonders if she needs to be taking this medication daily.    Acquired hypothyroidism  This is a chronic condition which is well controlled on medications. Patient is tolerating medications without side effects.      Essential hypertension  Patient is a 48-year-old female who is here for follow-up.  She does have whitecoat hypertension and was started on metoprolol by her previous PCP for complaints of palpitations.  Her lisinopril was discontinued due to hypotensive episodes.  She checks her blood pressure at least once daily.  Her readings are quite variable from 120-140 systolic.  Her blood pressure today is 160/98 and 152/80 on the wall-mounted cuff, her wrist cuff measures in 130's systolic. She denies CP, SOB, HA, blurred vision.         Past Medical History:   Diagnosis Date   • Allergy    • Anxiety    • Arrhythmia     Sounds like PVC   • Asthma    • Clotting disorder (HCC)    • Thyroid disease     was hyperthyroid and had radiation to thyroid     History reviewed. No pertinent surgical history.  Social History     Social History   • Marital status:      Spouse name: N/A   • Number of children: N/A   • Years of education: N/A      Occupational History   • Not on file.     Social History Main Topics   • Smoking status: Never Smoker   • Smokeless tobacco: Never Used   • Alcohol use No   • Drug use: No   • Sexual activity: Yes     Partners: Male     Birth control/ protection: Pill     Other Topics Concern   • Not on file     Social History Narrative   • No narrative on file     History   Smoking Status   • Never Smoker   Smokeless Tobacco   • Never Used     Family History   Problem Relation Age of Onset   • Heart Disease Father    • Alcohol/Drug Father    • Hyperlipidemia Father    • Hypertension Father      Allergies   Allergen Reactions   • Bactrim Ds    • Carrot [Daucus Carota]    • Food      Cilantro, pecans, walnuts, carrots, bananas, cantaloupe   • Sulfa Drugs        Problem List:     Patient Active Problem List    Diagnosis Date Noted   • Palpitations 07/16/2018   • Cat scratch 07/16/2018   • Ganglion, right wrist 05/30/2018   • Perimenopause 05/30/2018   • Generalized anxiety disorder 09/18/2017   • Acquired hypothyroidism 08/18/2017   • Lupus anticoagulant syndrome (HCC) 08/18/2017   • Essential hypertension 08/18/2017   • Mild intermittent asthma without complication 08/18/2017   • Esophageal spasm 08/18/2017   • Deformity of toenail 08/18/2017   • Vitamin D deficiency 08/18/2017         Medications:     Current Outpatient Prescriptions:   •  amLODIPine (NORVASC) 2.5 MG Tab, Take 1 Tab by mouth every day., Disp: 90 Tab, Rfl: 1  •  citalopram (CELEXA) 10 MG tablet, Take 2 Tabs by mouth every day., Disp: 90 Tab, Rfl: 1  •  metoprolol SR (TOPROL XL) 25 MG TABLET SR 24 HR, Take 1 Tab by mouth every day., Disp: 90 Tab, Rfl: 1  •  levothyroxine (SYNTHROID) 88 MCG Tab, TAKE 1 TABLET BY MOUTH ONCE DAILY IN THE MORNING ON  AN  EMPTY  STOMACH, Disp: 90 Tab, Rfl: 3  •  JENCYCLA 0.35 MG tablet, , Disp: , Rfl:   •  Pseudoeph-Doxylamine-DM-APAP (NYQUIL PO), Take  by mouth., Disp: , Rfl:   •  Multiple Vitamins-Minerals (CENTRUM ADULTS PO), Take   "by mouth., Disp: , Rfl:   •  lorazepam (ATIVAN) 0.5 MG Tab, Take 1 Tab by mouth 1 time daily as needed for Anxiety., Disp: 15 Tab, Rfl: 0  •  albuterol (VENTOLIN OR PROVENTIL) 108 (90 BASE) MCG/ACT Aero Soln inhalation aerosol, Inhale 2 Puffs by mouth every 6 hours as needed for Shortness of Breath., Disp: 8.5 g, Rfl: 1      Review of Systems:     Pertinent positives as per HPI, all other systems reviewed and WNL     Physical Assessment:     VS: /80 (BP Location: Left arm, Patient Position: Sitting, BP Cuff Size: Adult)   Pulse 72   Temp 36.4 °C (97.6 °F)   Resp 12   Ht 1.702 m (5' 7\")   Wt 75.8 kg (167 lb)   SpO2 98%   BMI 26.16 kg/m²     General: Well-developed, well-nourished, female     Head: PERRL, EOMI. Normocephalic. No facial asymmetry noted.  Cardiovasc:  RRR, no MRG. No thrills or bruits. Pulses 2+ and symmetric at all distal extremities.  Pulmonary: Lungs clear bilaterally.  Normal respiratory effort. No wheeze or crackles.   Neuro: Alert and oriented, CNs II-XII intact, no focal deficits.  Skin:No rashes noted. Skin warm, dry, intact    Psych: Dressed appropriately for the weather, pleasant and conversant.  Affect, mood & judgment appropriate.      Assessment/Plan:   Penny was seen today for hypertension.    Diagnoses and all orders for this visit:    Mood swings, controlled   -Decrease Celexa to 10 mg daily given fatigue with 20 mg daily and no added benefit to management of anxiety and mood swings.  Follow-up in 1 month, sooner if needed.  -     citalopram (CELEXA) 10 MG tablet; Take 2 Tabs by mouth every day.    TOBY (generalized anxiety disorder) controlled, see #1  -     citalopram (CELEXA) 10 MG tablet; Take 2 Tabs by mouth every day.    Essential hypertension, chronic, uncontrolled  -Discussed whitecoat hypertension versus true hypertension in great detail today.  I would like her not to check her blood pressure unless she has symptoms such as HA, blurred vision.  She will start on " amlodipine 2.5 mg once daily and follow-up in 7-10 days for blood pressure check.  If her blood pressure is normal and she has no side effects on medication she will follow-up in the office in 1 month.  -     metoprolol SR (TOPROL XL) 25 MG TABLET SR 24 HR; Take 1 Tab by mouth every day.  -     amLODIPine (NORVASC) 2.5 MG Tab; Take 1 Tab by mouth every day.    Palpitations, stable   - she would like to trial d/c of this medication, discussed potential for rebound symptoms, may consider pill in pocket approach to palpitations. Continue to monitor.   -     metoprolol SR (TOPROL XL) 25 MG TABLET SR 24 HR; Take 1 Tab by mouth every day.    Acquired hypothyroidism, chronic, controlled on present treatment, monitor labs every 6-12 months.  -     levothyroxine (SYNTHROID) 88 MCG Tab; TAKE 1 TABLET BY MOUTH ONCE DAILY IN THE MORNING ON  AN  EMPTY  STOMACH      Patient is agreeable to the above plan and voiced understanding. All questions answered.     Please note that this dictation was created using voice recognition software. I have made every reasonable attempt to correct obvious errors, but I expect that there are errors of grammar and possibly content that I did not discover before finalizing the note.      Padmini Holland, LULU  3/5/2019, 3:20 PM

## 2019-03-05 NOTE — ASSESSMENT & PLAN NOTE
Patient is a 48-year-old female with generalized anxiety disorder.  She feels that her anxiety is very well controlled on Celexa, we did discuss a trial increase to Celexa 20 mg daily at our last office visit.  Since then she has had more fatigue and does not feel that this has significantly improved her anxiety, thus she would like to go back to 10 mg daily.

## 2019-03-13 ENCOUNTER — NON-PROVIDER VISIT (OUTPATIENT)
Dept: MEDICAL GROUP | Facility: PHYSICIAN GROUP | Age: 49
End: 2019-03-13
Payer: COMMERCIAL

## 2019-03-13 VITALS — DIASTOLIC BLOOD PRESSURE: 78 MMHG | SYSTOLIC BLOOD PRESSURE: 138 MMHG | HEART RATE: 88 BPM

## 2019-03-13 NOTE — NON-PROVIDER
Penny Todd is a 48 y.o. female here for a non-provider visit for BP Check    Vitals:    03/13/19 1517   BP: 138/78   Pulse: 88     If abnormal, was an in office provider notified today? Yes  Routed to PCP? Yes    Pt states she took medication this morning, does get a slight headache when standing to fast but this has been decreasing since starting new medication.

## 2019-03-29 ENCOUNTER — TELEPHONE (OUTPATIENT)
Dept: URGENT CARE | Facility: PHYSICIAN GROUP | Age: 49
End: 2019-03-29

## 2019-03-29 NOTE — TELEPHONE ENCOUNTER
I notified patient of the message.  She said that she did not take the Norvasc today to just to see if that helps.  She said that she believes that the Norvasc is causing her blood pressure to go up.  This morning she did not take the Norvasc and she took her BP and pulse and they were 123/82 and pulse was 109.  She then took it this afternoon and it was 135/80 and pulse was 84 and she felt much better her headache was gone and she felt better.  She had mentioned just stopping the Norvasc until her apt with Padmini next week and I told her that would be her choice but  wanted her to try taking it at night and see if that helps and to call us if it does not help. She notified understanding and said she would try taking it at night.

## 2019-03-29 NOTE — TELEPHONE ENCOUNTER
BP of 138/78 is a better reading for her.  Sometimes the lowering of bp will take the body a few days to get used to.  I recommend taking the Norvasc at bedtime for a few days and see if that helps. If not then ok to stop the amlodipine and come in for another bp at the office next week.

## 2019-03-29 NOTE — TELEPHONE ENCOUNTER
1. Caller Name: Pt                      Call Back Number: [phone] (home)     2. Message: Pt came into the office on 3/13/19 for a BP check at this time Pt stated slight headache when standing. Pt called in today stating that she has had the same headache for 3 days would like to know if she should stop the medication. I informed her that Padmini was out of the office and I would sent this to the provider in office today. Please advise.    3. Patient approves office to leave a detailed voicemail/MyChart message: yes

## 2019-04-04 ENCOUNTER — OFFICE VISIT (OUTPATIENT)
Dept: MEDICAL GROUP | Facility: PHYSICIAN GROUP | Age: 49
End: 2019-04-04
Payer: COMMERCIAL

## 2019-04-04 VITALS
OXYGEN SATURATION: 98 % | HEIGHT: 67 IN | DIASTOLIC BLOOD PRESSURE: 80 MMHG | HEART RATE: 89 BPM | BODY MASS INDEX: 26.37 KG/M2 | WEIGHT: 168 LBS | SYSTOLIC BLOOD PRESSURE: 130 MMHG | RESPIRATION RATE: 12 BRPM | TEMPERATURE: 98.7 F

## 2019-04-04 DIAGNOSIS — I10 ESSENTIAL HYPERTENSION: ICD-10-CM

## 2019-04-04 PROCEDURE — 99213 OFFICE O/P EST LOW 20 MIN: CPT | Performed by: NURSE PRACTITIONER

## 2019-04-04 NOTE — PROGRESS NOTES
[place] [facility]  Primary Care Office Visit - Problem-Oriented        History:     Penny Todd is a 48 y.o. female who is here today to discuss Hypertension (FV)      Essential hypertension  Patient is a 48-year-old female who is here for follow-up of hypertension. Patient  metoprolol by her previous PCP for complaints of palpitations.  Her lisinopril was discontinued due to hypotensive episodes. At our last visit she was started on amlodipine 2.5mg daily. I asked her not to check her blood pressure multiple times daily and she has been compliant, having less elevated episodes related to the anxiety component of checking her BP. Readings have been 120's sytolic. Has been having fleeting posterior occipital headaches that last only moments to minutes and resolve on their own, this has improved somewhat since taking amlodipine at night. No CP, SOB, change in vision.         Past Medical History:   Diagnosis Date   • Allergy    • Anxiety    • Arrhythmia     Sounds like PVC   • Asthma    • Clotting disorder (HCC)    • Thyroid disease     was hyperthyroid and had radiation to thyroid     History reviewed. No pertinent surgical history.  Social History     Social History   • Marital status:      Spouse name: N/A   • Number of children: N/A   • Years of education: N/A     Occupational History   • Not on file.     Social History Main Topics   • Smoking status: Never Smoker   • Smokeless tobacco: Never Used   • Alcohol use No   • Drug use: No   • Sexual activity: Yes     Partners: Male     Birth control/ protection: Pill     Other Topics Concern   • Not on file     Social History Narrative   • No narrative on file     History   Smoking Status   • Never Smoker   Smokeless Tobacco   • Never Used     Family History   Problem Relation Age of Onset   • Heart Disease Father    • Alcohol/Drug Father    • Hyperlipidemia Father    • Hypertension Father      Allergies   Allergen Reactions   • Bactrim Ds    • Carrot  "[Daucus Carota]    • Food      Cilantro, pecans, walnuts, carrots, bananas, cantaloupe   • Sulfa Drugs        Problem List:     Patient Active Problem List    Diagnosis Date Noted   • Palpitations 07/16/2018   • Cat scratch 07/16/2018   • Ganglion, right wrist 05/30/2018   • Perimenopause 05/30/2018   • Generalized anxiety disorder 09/18/2017   • Acquired hypothyroidism 08/18/2017   • Lupus anticoagulant syndrome (HCC) 08/18/2017   • Essential hypertension 08/18/2017   • Mild intermittent asthma without complication 08/18/2017   • Esophageal spasm 08/18/2017   • Deformity of toenail 08/18/2017   • Vitamin D deficiency 08/18/2017         Medications:     Current Outpatient Prescriptions:   •  amLODIPine (NORVASC) 2.5 MG Tab, Take 1 Tab by mouth every day., Disp: 90 Tab, Rfl: 1  •  citalopram (CELEXA) 10 MG tablet, Take 2 Tabs by mouth every day., Disp: 90 Tab, Rfl: 1  •  levothyroxine (SYNTHROID) 88 MCG Tab, TAKE 1 TABLET BY MOUTH ONCE DAILY IN THE MORNING ON  AN  EMPTY  STOMACH, Disp: 90 Tab, Rfl: 3  •  lorazepam (ATIVAN) 0.5 MG Tab, Take 1 Tab by mouth 1 time daily as needed for Anxiety., Disp: 15 Tab, Rfl: 0  •  JENCYCLA 0.35 MG tablet, , Disp: , Rfl:   •  Pseudoeph-Doxylamine-DM-APAP (NYQUIL PO), Take  by mouth., Disp: , Rfl:   •  Multiple Vitamins-Minerals (CENTRUM ADULTS PO), Take  by mouth., Disp: , Rfl:   •  albuterol (VENTOLIN OR PROVENTIL) 108 (90 BASE) MCG/ACT Aero Soln inhalation aerosol, Inhale 2 Puffs by mouth every 6 hours as needed for Shortness of Breath., Disp: 8.5 g, Rfl: 1  •  metoprolol SR (TOPROL XL) 25 MG TABLET SR 24 HR, Take 1 Tab by mouth every day., Disp: 90 Tab, Rfl: 1      Review of Systems:     Pertinent positives as per HPI, all other systems reviewed and WNL  Physical Assessment:     VS: /80 (BP Location: Left arm, Patient Position: Sitting, BP Cuff Size: Adult)   Pulse 89   Temp 37.1 °C (98.7 °F)   Resp 12   Ht 1.702 m (5' 7\")   Wt 76.2 kg (168 lb)   SpO2 98%   BMI 26.31 " kg/m²     General: Well-developed, well-nourished, female     Head: PERRL, EOMI. Normocephalic. No facial asymmetry noted.  Cardiovasc: RRR, no MRG. No thrills or bruits. Pulses 2+ and symmetric at all distal extremities.  Pulmonary: Lungs clear bilaterally.  Normal respiratory effort. No wheeze or crackles.   Extremities: No edema  Neuro: Alert and oriented, CNs II-XII intact, no focal deficits.  Skin:No rashes noted. Skin warm, dry, intact    Psych: Dressed appropriately for the weather, pleasant and conversant.  Affect, mood & judgment appropriate.      Assessment/Plan:   Penny was seen today for hypertension.    Diagnoses and all orders for this visit:    Essential hypertension, ongoing, well controlled   - continue amlodipine 2.5mg nightly, monitor for AE, follow up in 3 months, sooner if needed.           Patient is agreeable to the above plan and voiced understanding. All questions answered.     Please note that this dictation was created using voice recognition software. I have made every reasonable attempt to correct obvious errors, but I expect that there are errors of grammar and possibly content that I did not discover before finalizing the note.      LULU Peterson  4/4/2019, 3:21 PM

## 2019-04-04 NOTE — ASSESSMENT & PLAN NOTE
Patient is a 48-year-old female who is here for follow-up of hypertension. Patient  metoprolol by her previous PCP for complaints of palpitations.  Her lisinopril was discontinued due to hypotensive episodes. At our last visit she was started on amlodipine 2.5mg daily. I asked her not to check her blood pressure multiple times daily and she has been compliant, having less elevated episodes related to the anxiety component of checking her BP. Readings have been 120's sytolic. Has been having fleeting posterior occipital headaches that last only moments to minutes and resolve on their own, this has improved somewhat since taking amlodipine at night. No CP, SOB, change in vision.

## 2019-07-25 ENCOUNTER — OFFICE VISIT (OUTPATIENT)
Dept: MEDICAL GROUP | Facility: PHYSICIAN GROUP | Age: 49
End: 2019-07-25
Payer: COMMERCIAL

## 2019-07-25 VITALS
DIASTOLIC BLOOD PRESSURE: 72 MMHG | SYSTOLIC BLOOD PRESSURE: 136 MMHG | BODY MASS INDEX: 26.84 KG/M2 | HEIGHT: 67 IN | TEMPERATURE: 98.9 F | OXYGEN SATURATION: 96 % | HEART RATE: 91 BPM | WEIGHT: 171 LBS | RESPIRATION RATE: 14 BRPM

## 2019-07-25 DIAGNOSIS — Z12.39 BREAST CANCER SCREENING: ICD-10-CM

## 2019-07-25 DIAGNOSIS — R00.2 PALPITATIONS: ICD-10-CM

## 2019-07-25 DIAGNOSIS — F41.1 GAD (GENERALIZED ANXIETY DISORDER): ICD-10-CM

## 2019-07-25 DIAGNOSIS — R53.83 FATIGUE, UNSPECIFIED TYPE: ICD-10-CM

## 2019-07-25 DIAGNOSIS — M67.431 GANGLION, RIGHT WRIST: ICD-10-CM

## 2019-07-25 DIAGNOSIS — E55.9 VITAMIN D DEFICIENCY: ICD-10-CM

## 2019-07-25 DIAGNOSIS — E03.9 ACQUIRED HYPOTHYROIDISM: ICD-10-CM

## 2019-07-25 DIAGNOSIS — R45.86 MOOD SWINGS: ICD-10-CM

## 2019-07-25 DIAGNOSIS — F41.1 GENERALIZED ANXIETY DISORDER: ICD-10-CM

## 2019-07-25 DIAGNOSIS — I10 ESSENTIAL HYPERTENSION: ICD-10-CM

## 2019-07-25 PROCEDURE — 99214 OFFICE O/P EST MOD 30 MIN: CPT | Performed by: NURSE PRACTITIONER

## 2019-07-25 RX ORDER — CITALOPRAM HYDROBROMIDE 10 MG/1
10 TABLET ORAL DAILY
Qty: 90 TAB | Refills: 1 | Status: ON HOLD | OUTPATIENT
Start: 2019-07-25 | End: 2019-09-18

## 2019-07-25 RX ORDER — CITALOPRAM HYDROBROMIDE 10 MG/1
20 TABLET ORAL DAILY
Qty: 90 TAB | Refills: 1 | Status: SHIPPED | OUTPATIENT
Start: 2019-07-25 | End: 2019-07-25 | Stop reason: SDUPTHER

## 2019-07-25 RX ORDER — AMLODIPINE BESYLATE 2.5 MG/1
2.5 TABLET ORAL DAILY
Qty: 90 TAB | Refills: 1 | Status: ON HOLD | OUTPATIENT
Start: 2019-07-25 | End: 2019-09-18

## 2019-07-25 NOTE — PROGRESS NOTES
CC: Establish care, medication refills for anxiety and hypertension, ganglion cyst    HISTORY OF THE PRESENT ILLNESS: Patient is a 49 y.o. female. This pleasant patient is here today for evaluation management of the following health problems and to establish care.    Health Maintenance: due.  Pap is scheduled in 2 months with [facility].      Essential hypertension  This is a chronic health problem that is well controlled with current medications and lifestyle measures.  Taking amlodipine 2.5 mg daily.  Was getting headaches with medication.  Reports headaches have resolved by taking medication at bedtime.  Patient was also taking metoprolol up until 3 months ago.  Discontinued metoprolol because of intermittent lightheadedness.  Was taking metoprolol for palpitations.  Reports palpitations had resolved until a couple weeks ago.  Has had palpitations 2 or 3 times a week for the last 2 weeks, lasting 10 seconds.  Denies chest pain, lightheadedness, shortness of breath during episodes.  Did advise patient that if palpitations continue to occur, then to restart metoprolol 25 mg 3 days a week to see if this helps.  If this does not help with palpitations, patient will start taking metoprolol daily and return to clinic for further evaluation.      Generalized anxiety disorder  This is a chronic health problem that is well controlled with current medications and lifestyle measures.  Taking citalopram 10 mg daily.  Patient did trial 20 mg daily, but caused increased fatigue.  Discussed nonmedication options including routine aerobic exercise and counseling.  Patient will work on starting exercise routine, declines counseling.  Denies SI/HI.      Ganglion, right wrist  Patient continues with right ganglion cyst, getting larger.  Denies tenderness at the cyst, but does report aching in radial aspect of right wrist.  Has had cyst for over a year.  Will refer to hand surgery for further evaluation and consideration of  removal.      Acquired hypothyroidism  Chronic health problem, taking levothyroxine 88 mcg daily.  Denies skin or hair changes, brain fog, constipation.  Does report some fatigue.  Component      Latest Ref Rng & Units 3/1/2019 3/1/2019           7:56 AM  7:56 AM   TSH      0.380 - 5.330 uIU/mL 0.290 (L)    Free T-4      0.53 - 1.43 ng/dL  1.18       Palpitations  See additional notes under hypertension.      Allergies: Bactrim ds; Carrot [daucus carota]; Food; and Sulfa drugs    Current Outpatient Prescriptions Ordered in [facility]   Medication Sig Dispense Refill   • amLODIPine (NORVASC) 2.5 MG Tab Take 1 Tab by mouth every day. 90 Tab 1   • citalopram (CELEXA) 10 MG tablet Take 1 Tab by mouth every day. 90 Tab 1   • levothyroxine (SYNTHROID) 88 MCG Tab TAKE 1 TABLET BY MOUTH ONCE DAILY IN THE MORNING ON  AN  EMPTY  STOMACH 90 Tab 3   • JENCYCLA 0.35 MG tablet      • Multiple Vitamins-Minerals (CENTRUM ADULTS PO) Take  by mouth.     • metoprolol SR (TOPROL XL) 25 MG TABLET SR 24 HR Take 1 Tab by mouth every day. 90 Tab 1   • lorazepam (ATIVAN) 0.5 MG Tab Take 1 Tab by mouth 1 time daily as needed for Anxiety. 15 Tab 0   • Pseudoeph-Doxylamine-DM-APAP (NYQUIL PO) Take  by mouth.     • albuterol (VENTOLIN OR PROVENTIL) 108 (90 BASE) MCG/ACT Aero Soln inhalation aerosol Inhale 2 Puffs by mouth every 6 hours as needed for Shortness of Breath. 8.5 g 1     No current [facility]-ordered facility-administered medications on file.        Past Medical History:   Diagnosis Date   • Allergy    • Anxiety    • Arrhythmia     Sounds like PVC   • Asthma    • Clotting disorder (HCC)    • Thyroid disease     was hyperthyroid and had radiation to thyroid       History reviewed. No pertinent surgical history.    Social History   Substance Use Topics   • Smoking status: Never Smoker   • Smokeless tobacco: Never Used   • Alcohol use No       Family History   Problem Relation Age of Onset   • Heart Disease Father    • Alcohol/Drug Father    •  "Hyperlipidemia Father    • Hypertension Father        ROS:   As in HPI, otherwise negative for chest pain, dyspnea, abdominal pain, dysuria, blood in stool, fever. Positive fatigue.          Exam: /72 (BP Location: Left arm, Patient Position: Sitting, BP Cuff Size: Adult)   Pulse 91   Temp 37.2 °C (98.9 °F)   Resp 14   Ht 1.702 m (5' 7\")   Wt 77.6 kg (171 lb)   SpO2 96%  Body mass index is 26.78 kg/m².    General: Alert, pleasant, well nourished, well developed female in NAD  HEENT: Normocephalic. Eyes conjunctiva clear lids without ptosis, pupils equal and reactive to light, ears normal shape and contour, canals are clear bilaterally, tympanic membranes are pearly gray with good light reflex, nasal mucosa without erythema and drainage, oropharynx is without erythema, edema or exudates.   Neck: Supple without bruit. Thyroid is not enlarged.  Pulmonary: Clear to ausculation.  Normal effort. No rales, ronchi, or wheezing.  Cardiovascular: Normal rate and rhythm without murmur. Carotid and radial pulses are intact and equal bilaterally.  No lower extremity edema.  Abdomen: Soft, nontender, nondistended. Normal bowel sounds. Liver and spleen are not palpable  Neurologic: Grossly nonfocal  Lymph: No cervical or supraclavicular lymph nodes are palpable  Skin: Warm and dry.  No obvious lesions.  Musculoskeletal: Normal gait.  Right radial side of wrist with 2 cm soft, mobile cyst, no erythema, nontender to palpation.  Wrist with full active range of motion.  Psych: Normal mood and affect. Alert and oriented. Judgment and insight is normal.    Please note that this dictation was created using voice recognition software. I have made every reasonable attempt to correct obvious errors, but I expect that there are errors of grammar and possibly content that I did not discover before finalizing the note.      Assessment/Plan  1. Essential hypertension  Well-controlled.  Continue with amlodipine.  Continue to work on " lifestyle measures including DASH diet and routine aerobic exercise.  - amLODIPine (NORVASC) 2.5 MG Tab; Take 1 Tab by mouth every day.  Dispense: 90 Tab; Refill: 1  - Comp Metabolic Panel; Future  - ESTIMATED GFR; Future    2. Generalized anxiety disorder  Continue with citalopram 10 mg daily.  Work on lifestyle measures including routine aerobic exercise.    3. Ganglion, right wrist  Cyst becoming larger and wrist now aching.  No signs of infection.  Will refer to hand surgery for further evaluation.  - REFERRAL TO HAND SURGERY    4. Fatigue, unspecified type  Patient reports fatigue.  Will get updated labs to include CBC and vitamin D level.  - CBC WITHOUT DIFFERENTIAL; Future    5. Acquired hypothyroidism  We will get updated TSH prior to next appointment.  Last TSH was mildly low.  Patient reports fatigue.  - TSH; Future    6. Breast cancer screening  Ordered.  - MA-SCREENING MAMMO BILAT W/TOMOSYNTHESIS W/CAD; Future    7. Mood swings    - citalopram (CELEXA) 10 MG tablet; Take 1 Tab by mouth every day.  Dispense: 90 Tab; Refill: 1    8. TOBY (generalized anxiety disorder)    - citalopram (CELEXA) 10 MG tablet; Take 1 Tab by mouth every day.  Dispense: 90 Tab; Refill: 1    9. Palpitations  Patient reports return of palpitations in last 2 weeks.  Was taking metoprolol up until 4 months ago.  She will continue to monitor.  If palpitations increase, she will restart metoprolol 3 days a week.  If this does not help, she will resume metoprolol daily and will follow-up in clinic sooner than scheduled appointment.    10. Vitamin D deficiency  History of vitamin D deficiency.  Will get updated vitamin D level.  - VITAMIN D,25 HYDROXY; Future    Patient will return to clinic in 6 months or sooner if needed.

## 2019-09-06 DIAGNOSIS — Z01.812 PRE-PROCEDURAL LABORATORY EXAMINATION: ICD-10-CM

## 2019-09-06 DIAGNOSIS — Z01.810 PRE-OPERATIVE CARDIOVASCULAR EXAMINATION: ICD-10-CM

## 2019-09-06 LAB
EKG IMPRESSION: NORMAL
ERYTHROCYTE [DISTWIDTH] IN BLOOD BY AUTOMATED COUNT: 40.8 FL (ref 35.9–50)
HCT VFR BLD AUTO: 42.3 % (ref 37–47)
HGB BLD-MCNC: 13.9 G/DL (ref 12–16)
MCH RBC QN AUTO: 29.4 PG (ref 27–33)
MCHC RBC AUTO-ENTMCNC: 32.9 G/DL (ref 33.6–35)
MCV RBC AUTO: 89.4 FL (ref 81.4–97.8)
PLATELET # BLD AUTO: 256 K/UL (ref 164–446)
PMV BLD AUTO: 10 FL (ref 9–12.9)
RBC # BLD AUTO: 4.73 M/UL (ref 4.2–5.4)
WBC # BLD AUTO: 9.4 K/UL (ref 4.8–10.8)

## 2019-09-06 PROCEDURE — 85027 COMPLETE CBC AUTOMATED: CPT

## 2019-09-06 PROCEDURE — 93010 ELECTROCARDIOGRAM REPORT: CPT | Performed by: INTERNAL MEDICINE

## 2019-09-06 PROCEDURE — 93005 ELECTROCARDIOGRAM TRACING: CPT | Performed by: SURGERY

## 2019-09-06 PROCEDURE — 36415 COLL VENOUS BLD VENIPUNCTURE: CPT

## 2019-09-06 RX ORDER — CETIRIZINE HYDROCHLORIDE 10 MG/1
10 TABLET ORAL PRN
COMMUNITY

## 2019-09-18 ENCOUNTER — ANESTHESIA EVENT (OUTPATIENT)
Dept: SURGERY | Facility: MEDICAL CENTER | Age: 49
End: 2019-09-18
Payer: COMMERCIAL

## 2019-09-18 ENCOUNTER — ANESTHESIA (OUTPATIENT)
Dept: SURGERY | Facility: MEDICAL CENTER | Age: 49
End: 2019-09-18
Payer: COMMERCIAL

## 2019-09-18 ENCOUNTER — HOSPITAL ENCOUNTER (OUTPATIENT)
Facility: MEDICAL CENTER | Age: 49
End: 2019-09-18
Attending: SURGERY | Admitting: SURGERY
Payer: COMMERCIAL

## 2019-09-18 VITALS
SYSTOLIC BLOOD PRESSURE: 148 MMHG | OXYGEN SATURATION: 93 % | HEIGHT: 67 IN | DIASTOLIC BLOOD PRESSURE: 81 MMHG | WEIGHT: 175.04 LBS | HEART RATE: 77 BPM | RESPIRATION RATE: 18 BRPM | BODY MASS INDEX: 27.47 KG/M2 | TEMPERATURE: 97.5 F

## 2019-09-18 DIAGNOSIS — M67.431 GANGLION, RIGHT WRIST: ICD-10-CM

## 2019-09-18 LAB
HCG UR QL: NEGATIVE
PATHOLOGY CONSULT NOTE: NORMAL
SP GR UR REFRACTOMETRY: 1.01

## 2019-09-18 PROCEDURE — 160048 HCHG OR STATISTICAL LEVEL 1-5: Performed by: SURGERY

## 2019-09-18 PROCEDURE — 88304 TISSUE EXAM BY PATHOLOGIST: CPT

## 2019-09-18 PROCEDURE — 160025 RECOVERY II MINUTES (STATS): Performed by: SURGERY

## 2019-09-18 PROCEDURE — 700101 HCHG RX REV CODE 250: Performed by: SURGERY

## 2019-09-18 PROCEDURE — 160039 HCHG SURGERY MINUTES - EA ADDL 1 MIN LEVEL 3: Performed by: SURGERY

## 2019-09-18 PROCEDURE — 501838 HCHG SUTURE GENERAL: Performed by: SURGERY

## 2019-09-18 PROCEDURE — 160035 HCHG PACU - 1ST 60 MINS PHASE I: Performed by: SURGERY

## 2019-09-18 PROCEDURE — 160046 HCHG PACU - 1ST 60 MINS PHASE II: Performed by: SURGERY

## 2019-09-18 PROCEDURE — 81025 URINE PREGNANCY TEST: CPT

## 2019-09-18 PROCEDURE — 500881 HCHG PACK, EXTREMITY: Performed by: SURGERY

## 2019-09-18 PROCEDURE — 700101 HCHG RX REV CODE 250: Performed by: ANESTHESIOLOGY

## 2019-09-18 PROCEDURE — 700111 HCHG RX REV CODE 636 W/ 250 OVERRIDE (IP)

## 2019-09-18 PROCEDURE — 160028 HCHG SURGERY MINUTES - 1ST 30 MINS LEVEL 3: Performed by: SURGERY

## 2019-09-18 PROCEDURE — 501480 HCHG STOCKINETTE: Performed by: SURGERY

## 2019-09-18 PROCEDURE — 160002 HCHG RECOVERY MINUTES (STAT): Performed by: SURGERY

## 2019-09-18 PROCEDURE — 160009 HCHG ANES TIME/MIN: Performed by: SURGERY

## 2019-09-18 PROCEDURE — 700111 HCHG RX REV CODE 636 W/ 250 OVERRIDE (IP): Performed by: ANESTHESIOLOGY

## 2019-09-18 PROCEDURE — 700105 HCHG RX REV CODE 258: Performed by: SURGERY

## 2019-09-18 RX ORDER — CEFAZOLIN SODIUM 1 G/3ML
INJECTION, POWDER, FOR SOLUTION INTRAMUSCULAR; INTRAVENOUS PRN
Status: DISCONTINUED | OUTPATIENT
Start: 2019-09-18 | End: 2019-09-18 | Stop reason: SURG

## 2019-09-18 RX ORDER — DEXAMETHASONE SODIUM PHOSPHATE 4 MG/ML
INJECTION, SOLUTION INTRA-ARTICULAR; INTRALESIONAL; INTRAMUSCULAR; INTRAVENOUS; SOFT TISSUE PRN
Status: DISCONTINUED | OUTPATIENT
Start: 2019-09-18 | End: 2019-09-18 | Stop reason: SURG

## 2019-09-18 RX ORDER — SODIUM CHLORIDE, SODIUM LACTATE, POTASSIUM CHLORIDE, CALCIUM CHLORIDE 600; 310; 30; 20 MG/100ML; MG/100ML; MG/100ML; MG/100ML
INJECTION, SOLUTION INTRAVENOUS CONTINUOUS
Status: DISCONTINUED | OUTPATIENT
Start: 2019-09-18 | End: 2019-09-18 | Stop reason: HOSPADM

## 2019-09-18 RX ORDER — DIPHENHYDRAMINE HYDROCHLORIDE 50 MG/ML
INJECTION INTRAMUSCULAR; INTRAVENOUS PRN
Status: DISCONTINUED | OUTPATIENT
Start: 2019-09-18 | End: 2019-09-18 | Stop reason: SURG

## 2019-09-18 RX ORDER — METOPROLOL SUCCINATE 25 MG/1
25 TABLET, EXTENDED RELEASE ORAL EVERY EVENING
COMMUNITY
End: 2020-01-28 | Stop reason: SDUPTHER

## 2019-09-18 RX ORDER — DIPHENHYDRAMINE HYDROCHLORIDE 50 MG/ML
12.5 INJECTION INTRAMUSCULAR; INTRAVENOUS
Status: DISCONTINUED | OUTPATIENT
Start: 2019-09-18 | End: 2019-09-18 | Stop reason: HOSPADM

## 2019-09-18 RX ORDER — BUPIVACAINE HYDROCHLORIDE AND EPINEPHRINE 5; 5 MG/ML; UG/ML
INJECTION, SOLUTION PERINEURAL
Status: DISCONTINUED | OUTPATIENT
Start: 2019-09-18 | End: 2019-09-18 | Stop reason: HOSPADM

## 2019-09-18 RX ORDER — KETOROLAC TROMETHAMINE 30 MG/ML
INJECTION, SOLUTION INTRAMUSCULAR; INTRAVENOUS PRN
Status: DISCONTINUED | OUTPATIENT
Start: 2019-09-18 | End: 2019-09-18 | Stop reason: SURG

## 2019-09-18 RX ORDER — HYDROMORPHONE HYDROCHLORIDE 1 MG/ML
0.2 INJECTION, SOLUTION INTRAMUSCULAR; INTRAVENOUS; SUBCUTANEOUS
Status: DISCONTINUED | OUTPATIENT
Start: 2019-09-18 | End: 2019-09-18 | Stop reason: HOSPADM

## 2019-09-18 RX ORDER — SODIUM CHLORIDE, SODIUM LACTATE, POTASSIUM CHLORIDE, AND CALCIUM CHLORIDE .6; .31; .03; .02 G/100ML; G/100ML; G/100ML; G/100ML
500 INJECTION, SOLUTION INTRAVENOUS
Status: DISCONTINUED | OUTPATIENT
Start: 2019-09-18 | End: 2019-09-18 | Stop reason: HOSPADM

## 2019-09-18 RX ORDER — MIDAZOLAM HYDROCHLORIDE 1 MG/ML
INJECTION INTRAMUSCULAR; INTRAVENOUS PRN
Status: DISCONTINUED | OUTPATIENT
Start: 2019-09-18 | End: 2019-09-18 | Stop reason: SURG

## 2019-09-18 RX ORDER — LEVOTHYROXINE SODIUM 88 UG/1
88 TABLET ORAL
COMMUNITY
End: 2020-03-16

## 2019-09-18 RX ORDER — ONDANSETRON 2 MG/ML
4 INJECTION INTRAMUSCULAR; INTRAVENOUS
Status: DISCONTINUED | OUTPATIENT
Start: 2019-09-18 | End: 2019-09-18 | Stop reason: HOSPADM

## 2019-09-18 RX ORDER — AMLODIPINE BESYLATE 2.5 MG/1
2.5 TABLET ORAL EVERY EVENING
COMMUNITY
End: 2020-01-28

## 2019-09-18 RX ORDER — HALOPERIDOL 5 MG/ML
1 INJECTION INTRAMUSCULAR
Status: DISCONTINUED | OUTPATIENT
Start: 2019-09-18 | End: 2019-09-18 | Stop reason: HOSPADM

## 2019-09-18 RX ORDER — ONDANSETRON 2 MG/ML
INJECTION INTRAMUSCULAR; INTRAVENOUS PRN
Status: DISCONTINUED | OUTPATIENT
Start: 2019-09-18 | End: 2019-09-18 | Stop reason: SURG

## 2019-09-18 RX ORDER — LIDOCAINE HYDROCHLORIDE 10 MG/ML
INJECTION, SOLUTION EPIDURAL; INFILTRATION; INTRACAUDAL; PERINEURAL
Status: COMPLETED
Start: 2019-09-18 | End: 2019-09-18

## 2019-09-18 RX ORDER — LIDOCAINE HYDROCHLORIDE 20 MG/ML
INJECTION, SOLUTION EPIDURAL; INFILTRATION; INTRACAUDAL; PERINEURAL PRN
Status: DISCONTINUED | OUTPATIENT
Start: 2019-09-18 | End: 2019-09-18 | Stop reason: SURG

## 2019-09-18 RX ORDER — HYDROMORPHONE HYDROCHLORIDE 1 MG/ML
0.1 INJECTION, SOLUTION INTRAMUSCULAR; INTRAVENOUS; SUBCUTANEOUS
Status: DISCONTINUED | OUTPATIENT
Start: 2019-09-18 | End: 2019-09-18 | Stop reason: HOSPADM

## 2019-09-18 RX ORDER — CITALOPRAM HYDROBROMIDE 10 MG/1
10 TABLET ORAL EVERY EVENING
COMMUNITY
End: 2020-03-16 | Stop reason: SDUPTHER

## 2019-09-18 RX ORDER — HYDROMORPHONE HYDROCHLORIDE 1 MG/ML
0.4 INJECTION, SOLUTION INTRAMUSCULAR; INTRAVENOUS; SUBCUTANEOUS
Status: DISCONTINUED | OUTPATIENT
Start: 2019-09-18 | End: 2019-09-18 | Stop reason: HOSPADM

## 2019-09-18 RX ORDER — MEPERIDINE HYDROCHLORIDE 25 MG/ML
6.25 INJECTION INTRAMUSCULAR; INTRAVENOUS; SUBCUTANEOUS
Status: DISCONTINUED | OUTPATIENT
Start: 2019-09-18 | End: 2019-09-18 | Stop reason: HOSPADM

## 2019-09-18 RX ORDER — OXYCODONE HCL 5 MG/5 ML
5 SOLUTION, ORAL ORAL EVERY 4 HOURS PRN
Status: DISCONTINUED | OUTPATIENT
Start: 2019-09-18 | End: 2019-09-18 | Stop reason: HOSPADM

## 2019-09-18 RX ORDER — OXYCODONE HCL 5 MG/5 ML
10 SOLUTION, ORAL ORAL
Status: DISCONTINUED | OUTPATIENT
Start: 2019-09-18 | End: 2019-09-18 | Stop reason: HOSPADM

## 2019-09-18 RX ORDER — LABETALOL HYDROCHLORIDE 5 MG/ML
5 INJECTION, SOLUTION INTRAVENOUS
Status: DISCONTINUED | OUTPATIENT
Start: 2019-09-18 | End: 2019-09-18 | Stop reason: HOSPADM

## 2019-09-18 RX ORDER — HYDROCODONE BITARTRATE AND ACETAMINOPHEN 5; 325 MG/1; MG/1
1-2 TABLET ORAL EVERY 4 HOURS PRN
Qty: 12 TAB | Refills: 0 | Status: SHIPPED | OUTPATIENT
Start: 2019-09-18 | End: 2019-09-21

## 2019-09-18 RX ADMIN — FENTANYL CITRATE 50 MCG: 50 INJECTION, SOLUTION INTRAMUSCULAR; INTRAVENOUS at 09:23

## 2019-09-18 RX ADMIN — Medication 0.5 ML: at 08:37

## 2019-09-18 RX ADMIN — CEFAZOLIN 2 G: 330 INJECTION, POWDER, FOR SOLUTION INTRAMUSCULAR; INTRAVENOUS at 09:19

## 2019-09-18 RX ADMIN — PROPOFOL 200 MG: 10 INJECTION, EMULSION INTRAVENOUS at 09:23

## 2019-09-18 RX ADMIN — DIPHENHYDRAMINE HYDROCHLORIDE 12.5 MG: 50 INJECTION, SOLUTION INTRAMUSCULAR; INTRAVENOUS at 09:39

## 2019-09-18 RX ADMIN — LIDOCAINE HYDROCHLORIDE 60 MG: 20 INJECTION, SOLUTION EPIDURAL; INFILTRATION; INTRACAUDAL at 09:23

## 2019-09-18 RX ADMIN — LIDOCAINE HYDROCHLORIDE 0.5 ML: 10 INJECTION, SOLUTION EPIDURAL; INFILTRATION; INTRACAUDAL at 08:37

## 2019-09-18 RX ADMIN — DEXAMETHASONE SODIUM PHOSPHATE 4 MG: 4 INJECTION, SOLUTION INTRA-ARTICULAR; INTRALESIONAL; INTRAMUSCULAR; INTRAVENOUS; SOFT TISSUE at 09:32

## 2019-09-18 RX ADMIN — MIDAZOLAM 2 MG: 1 INJECTION INTRAMUSCULAR; INTRAVENOUS at 09:20

## 2019-09-18 RX ADMIN — SODIUM CHLORIDE, POTASSIUM CHLORIDE, SODIUM LACTATE AND CALCIUM CHLORIDE: 600; 310; 30; 20 INJECTION, SOLUTION INTRAVENOUS at 08:37

## 2019-09-18 RX ADMIN — ONDANSETRON 4 MG: 2 INJECTION INTRAMUSCULAR; INTRAVENOUS at 09:32

## 2019-09-18 RX ADMIN — KETOROLAC TROMETHAMINE 30 MG: 30 INJECTION, SOLUTION INTRAMUSCULAR at 09:32

## 2019-09-18 ASSESSMENT — PAIN SCALES - GENERAL: PAIN_LEVEL: 0

## 2019-09-18 NOTE — ANESTHESIA POSTPROCEDURE EVALUATION
Patient: Penny Todd    Procedure Summary     Date:  09/18/19 Room / Location:  [place] [address] / SURGERY [facility]    Anesthesia Start:  0919 Anesthesia Stop:  0953    Procedures:       EXCISION, GANGLION CYST- WRIST (Right Wrist)      RELEASE, HAND, FOR DEQUERVAIN'S TENOSYNOVITIS (Right Wrist) Diagnosis:  (DEQUERVAINS TENOSYNOVITIS RIGHT WRIST)    Surgeon:  Tl Greene M.D. Responsible Provider:  Lennox Finley M.D.    Anesthesia Type:  general ASA Status:  2          Final Anesthesia Type: general  Last vitals  BP   Blood Pressure: 150/84    Temp   36.3 °C (97.4 °F)    Pulse   Pulse: 85   Resp   18    SpO2   95 %      Anesthesia Post Evaluation    Patient location during evaluation: PACU  Patient participation: complete - patient participated  Level of consciousness: responsive to verbal stimuli and sleepy but conscious  Pain score: 0    Airway patency: patent  Anesthetic complications: no  Cardiovascular status: hemodynamically stable  Respiratory status: acceptable  Hydration status: euvolemic    PONV: none

## 2019-09-18 NOTE — ANESTHESIA PROCEDURE NOTES
Airway  Date/Time: 9/18/2019 9:23 AM  Performed by: Lennox Finley M.D.  Authorized by: Lennox Finley M.D.     Location:  OR  Urgency:  Elective  Difficult Airway: No    Indications for Airway Management:  Anesthesia  Spontaneous Ventilation: absent    Sedation Level:  Deep  Preoxygenated: Yes    Patient Position:  Sniffing  Mask Difficulty Assessment:  1 - vent by mask  Final Airway Type:  Supraglottic airway  Final Supraglottic Airway:  Standard LMA  SGA Size:  3  Number of Attempts at Approach:  1

## 2019-09-18 NOTE — PROGRESS NOTES
Med rec updated and complete  Allergies reviewed  Interviewed pt with family at bedside with permission from pt.  Pt had a list of medications, went over list of medications and returned list of medications back to pts family at bedside.  Pt reports that she started her METOPROLOL SR 25MG on 9/11/2019.  Pt reports no antibiotics in the last 2 weeks

## 2019-09-18 NOTE — ANESTHESIA TIME REPORT
Anesthesia Start and Stop Event Times     Date Time Event    9/18/2019 0908 Ready for Procedure     0919 Anesthesia Start     0953 Anesthesia Stop        Responsible Staff  09/18/19    Name Role Begin End    Lennox Finley M.D. Anesth 0919 0953        Preop Diagnosis (Free Text):  Pre-op Diagnosis     DEQUERVAINS TENOSYNOVITIS RIGHT WRIST        Preop Diagnosis (Codes):    Post op Diagnosis  Ganglion cyst of dorsum of right wrist      Premium Reason  Non-Premium    Comments:

## 2019-09-18 NOTE — DISCHARGE INSTRUCTIONS
ACTIVITY: Rest and take it easy for the first 24 hours.  A responsible adult is recommended to remain with you during that time.  It is normal to feel sleepy.  We encourage you to not do anything that requires balance, judgment or coordination.    MILD FLU-LIKE SYMPTOMS ARE NORMAL. YOU MAY EXPERIENCE GENERALIZED MUSCLE ACHES, THROAT IRRITATION, HEADACHE AND/OR SOME NAUSEA.    FOR 24 HOURS DO NOT:  Drive, operate machinery or run household appliances.  Drink beer or alcoholic beverages.   Make important decisions or sign legal documents.    SPECIAL INSTRUCTIONS & SURGICAL DRESSING/BATHING:  Keep splint on, clean, and dry until clinic follow-up. Elevate above heart and ice frequently for at least 2 weeks.     No heavy lifting or grasping for at least 6 weeks.     No driving while on narcotic pain medications. Contact auto-insurance carrier for clearance to begin driving again.     Call MD or come to ER for any major concerns.     DIET: To avoid nausea, slowly advance diet as tolerated, avoiding spicy or greasy foods for the first day.  Add more substantial food to your diet according to your physician's instructions.  INCREASE FLUIDS AND FIBER TO AVOID CONSTIPATION.    FOLLOW-UP APPOINTMENT:  A follow-up appointment should be arranged with your doctor; call to schedule.    You should CALL YOUR PHYSICIAN if you develop:  Fever greater than 101 degrees F.  Pain not relieved by medication, or persistent nausea or vomiting.  Excessive bleeding (blood soaking through dressing) or unexpected drainage from the wound.  Extreme redness or swelling around the incision site, drainage of pus or foul smelling drainage.  Inability to urinate or empty your bladder within 8 hours.  Problems with breathing or chest pain.    You should call 911 if you develop problems with breathing or chest pain.  If you are unable to contact your doctor or surgical center, you should go to the nearest emergency room or urgent care center.   Physician's telephone #: [phone]    If any questions arise, call your doctor.  If your doctor is not available, please feel free to call the Surgical Center at [phone].  The Center is open Monday through Friday from 7AM to 7PM.  You can also call the HEALTH HOTLINE open 24 hours/day, 7 days/week and speak to a nurse at [phone], or toll free at [phone].    A registered nurse may call you a few days after your surgery to see how you are doing after your procedure.    MEDICATIONS: Resume taking daily medication.  Take prescribed pain medication with food.  If no medication is prescribed, you may take non-aspirin pain medication if needed.  PAIN MEDICATION CAN BE VERY CONSTIPATING.  Take a stool softener or laxative such as senokot, pericolace, or milk of magnesia if needed.    Prescription given for Norco. No oral pain medication given in recovery.    If your physician has prescribed pain medication that includes Acetaminophen (Tylenol), do not take additional Acetaminophen (Tylenol) while taking the prescribed medication.    Depression / Suicide Risk    As you are discharged from this [facility] facility, it is important to learn how to keep safe from harming yourself.    Recognize the warning signs:  · Abrupt changes in personality, positive or negative- including increase in energy   · Giving away possessions  · Change in eating patterns- significant weight changes-  positive or negative  · Change in sleeping patterns- unable to sleep or sleeping all the time   · Unwillingness or inability to communicate  · Depression  · Unusual sadness, discouragement and loneliness  · Talk of wanting to die  · Neglect of personal appearance   · Rebelliousness- reckless behavior  · Withdrawal from people/activities they love  · Confusion- inability to concentrate     If you or a loved one observes any of these behaviors or has concerns about self-harm, here's what you can do:  · Talk about it- your  feelings and reasons for harming yourself  · Remove any means that you might use to hurt yourself (examples: pills, rope, extension cords, firearm)  · Get professional help from the community (Mental Health, Substance Abuse, psychological counseling)  · Do not be alone:Call your Safe Contact- someone whom you trust who will be there for you.  · Call your local CRISIS HOTLINE 228-9662 or 209-596-2979  · Call your local Children's Mobile Crisis Response Team [place] (875) 931-3006 or www.[employer]  · Call the toll free National Suicide Prevention Hotlines   · National Suicide Prevention Lifeline 825-587-KFIE (7017)  · National Hope Line Network 800-SUICIDE (191-4257)

## 2019-09-18 NOTE — PROGRESS NOTES
Pt verbalizing request to go home, pt's VSS. Pt voided in BR.     Discharge instructions discussed with patient, all questions answered. Discharge instructions, prescriptions and personal belongings with patient.

## 2020-01-23 ENCOUNTER — HOSPITAL ENCOUNTER (OUTPATIENT)
Dept: LAB | Facility: MEDICAL CENTER | Age: 50
End: 2020-01-23
Attending: NURSE PRACTITIONER
Payer: COMMERCIAL

## 2020-01-23 DIAGNOSIS — E03.9 ACQUIRED HYPOTHYROIDISM: ICD-10-CM

## 2020-01-23 DIAGNOSIS — I10 ESSENTIAL HYPERTENSION: ICD-10-CM

## 2020-01-23 DIAGNOSIS — E55.9 VITAMIN D DEFICIENCY: ICD-10-CM

## 2020-01-23 DIAGNOSIS — R53.83 FATIGUE, UNSPECIFIED TYPE: ICD-10-CM

## 2020-01-23 LAB
25(OH)D3 SERPL-MCNC: 45 NG/ML (ref 30–100)
ALBUMIN SERPL BCP-MCNC: 4.4 G/DL (ref 3.2–4.9)
ALBUMIN/GLOB SERPL: 1.1 G/DL
ALP SERPL-CCNC: 76 U/L (ref 30–99)
ALT SERPL-CCNC: 18 U/L (ref 2–50)
ANION GAP SERPL CALC-SCNC: 10 MMOL/L (ref 0–11.9)
AST SERPL-CCNC: 18 U/L (ref 12–45)
BILIRUB SERPL-MCNC: 0.6 MG/DL (ref 0.1–1.5)
BUN SERPL-MCNC: 15 MG/DL (ref 8–22)
CALCIUM SERPL-MCNC: 10.3 MG/DL (ref 8.5–10.5)
CHLORIDE SERPL-SCNC: 104 MMOL/L (ref 96–112)
CO2 SERPL-SCNC: 27 MMOL/L (ref 20–33)
CREAT SERPL-MCNC: 0.88 MG/DL (ref 0.5–1.4)
ERYTHROCYTE [DISTWIDTH] IN BLOOD BY AUTOMATED COUNT: 39.7 FL (ref 35.9–50)
FASTING STATUS PATIENT QL REPORTED: NORMAL
GLOBULIN SER CALC-MCNC: 4 G/DL (ref 1.9–3.5)
GLUCOSE SERPL-MCNC: 91 MG/DL (ref 65–99)
HCT VFR BLD AUTO: 44.2 % (ref 37–47)
HGB BLD-MCNC: 15 G/DL (ref 12–16)
MCH RBC QN AUTO: 30.4 PG (ref 27–33)
MCHC RBC AUTO-ENTMCNC: 33.9 G/DL (ref 33.6–35)
MCV RBC AUTO: 89.5 FL (ref 81.4–97.8)
PLATELET # BLD AUTO: 266 K/UL (ref 164–446)
PMV BLD AUTO: 10.5 FL (ref 9–12.9)
POTASSIUM SERPL-SCNC: 4.2 MMOL/L (ref 3.6–5.5)
PROT SERPL-MCNC: 8.4 G/DL (ref 6–8.2)
RBC # BLD AUTO: 4.94 M/UL (ref 4.2–5.4)
SODIUM SERPL-SCNC: 141 MMOL/L (ref 135–145)
TSH SERPL DL<=0.005 MIU/L-ACNC: 1.36 UIU/ML (ref 0.38–5.33)
WBC # BLD AUTO: 6.2 K/UL (ref 4.8–10.8)

## 2020-01-23 PROCEDURE — 84443 ASSAY THYROID STIM HORMONE: CPT

## 2020-01-23 PROCEDURE — 82306 VITAMIN D 25 HYDROXY: CPT

## 2020-01-23 PROCEDURE — 80053 COMPREHEN METABOLIC PANEL: CPT

## 2020-01-23 PROCEDURE — 85027 COMPLETE CBC AUTOMATED: CPT

## 2020-01-23 PROCEDURE — 36415 COLL VENOUS BLD VENIPUNCTURE: CPT

## 2020-01-28 ENCOUNTER — OFFICE VISIT (OUTPATIENT)
Dept: MEDICAL GROUP | Facility: PHYSICIAN GROUP | Age: 50
End: 2020-01-28
Payer: COMMERCIAL

## 2020-01-28 VITALS
OXYGEN SATURATION: 96 % | HEIGHT: 67 IN | BODY MASS INDEX: 27.62 KG/M2 | RESPIRATION RATE: 14 BRPM | DIASTOLIC BLOOD PRESSURE: 74 MMHG | TEMPERATURE: 97.6 F | WEIGHT: 176 LBS | HEART RATE: 100 BPM | SYSTOLIC BLOOD PRESSURE: 144 MMHG

## 2020-01-28 DIAGNOSIS — R00.2 PALPITATIONS: ICD-10-CM

## 2020-01-28 DIAGNOSIS — D68.62 LUPUS ANTICOAGULANT SYNDROME (HCC): ICD-10-CM

## 2020-01-28 DIAGNOSIS — I10 ESSENTIAL HYPERTENSION: ICD-10-CM

## 2020-01-28 DIAGNOSIS — E55.9 VITAMIN D DEFICIENCY: ICD-10-CM

## 2020-01-28 DIAGNOSIS — E03.9 ACQUIRED HYPOTHYROIDISM: ICD-10-CM

## 2020-01-28 DIAGNOSIS — Z82.49 FAMILY HISTORY OF HEART DISEASE: ICD-10-CM

## 2020-01-28 DIAGNOSIS — R77.1 ELEVATED SERUM GLOBULIN LEVEL: ICD-10-CM
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
## 2020-01-28 PROBLEM — K22.4 ESOPHAGEAL SPASM: Status: RESOLVED | Noted: 2017-08-18 | Resolved: 2020-01-28

## 2020-01-28 PROCEDURE — 99214 OFFICE O/P EST MOD 30 MIN: CPT | Performed by: NURSE PRACTITIONER

## 2020-01-28 RX ORDER — METOPROLOL SUCCINATE 25 MG/1
25 TABLET, EXTENDED RELEASE ORAL EVERY EVENING
Qty: 90 TAB | Refills: 0 | Status: SHIPPED
Start: 2020-01-28 | End: 2020-02-27

## 2020-01-28 ASSESSMENT — PATIENT HEALTH QUESTIONNAIRE - PHQ9: CLINICAL INTERPRETATION OF PHQ2 SCORE: 0

## 2020-01-28 NOTE — LETTER
[facility]  FERNANDO DumontPFarheenRFarheenN.  [address] [place] [address]  Fax: [phone]   Authorization for Release/Disclosure of   Protected Health Information   Name: PENNY MCCALL : 1970 SSN: [id number]   Address: [address] [place] [address] Phone:    [phone] (home)    I authorize the entity listed below to release/disclose the PHI below to:   [facility]/FERNANDO DumontP.R.JAVI and TAMEKA Dumont.   Provider or Entity Name:   Dr. Sesay      Address   City, [facility]   Phone:      Fax:     Reason for request: continuity of care   Information to be released:    [  ] LAST COLONOSCOPY,  including any PATH REPORT and follow-up  [  ] LAST FIT/COLOGUARD RESULT [  ] LAST DEXA  [  ] LAST MAMMOGRAM  [X] LAST PAP  [  ] LAST LABS [  ] RETINA EXAM REPORT  [  ] IMMUNIZATION RECORDS  [  ] Release all info      [  ] Check here and initial the line next to each item to release ALL health information INCLUDING  _____ Care and treatment for drug and / or alcohol abuse  _____ HIV testing, infection status, or AIDS  _____ Genetic Testing    DATES OF SERVICE OR TIME PERIOD TO BE DISCLOSED: _____________  I understand and acknowledge that:  * This Authorization may be revoked at any time by you in writing, except if your health information has already been used or disclosed.  * Your health information that will be used or disclosed as a result of you signing this authorization could be re-disclosed by the recipient. If this occurs, your re-disclosed health information may no longer be protected by State or Federal laws.  * You may refuse to sign this Authorization. Your refusal will not affect your ability to obtain treatment.  * This Authorization becomes effective upon signing and will  on (date) __________.      If no date is indicated, this Authorization will  one (1) year from the signature date.    Name: Penny Churchill  Perez    Signature:   Date:     1/28/2020       PLEASE FAX REQUESTED RECORDS BACK TO: [phone]

## 2020-01-28 NOTE — PATIENT INSTRUCTIONS
Stop amlodipine    Take metoprolol 25 mg once a day    Take blood pressure 3 times a week    Get labs done before next appointment

## 2020-01-28 NOTE — PROGRESS NOTES
CC: Lab review, hypertension    HISTORY OF THE PRESENT ILLNESS: Patient is a 49 y.o. female. This pleasant patient is here today, accompanied by , for evaluation and management of the following health problems.    Health Maintenance: Patient declined flu vaccine, though we reviewed benefits of flu vaccine.  Encouraged him to wash his hands frequently and stay out of environments with people who are ill.  Patient reports had Pap smear few months ago, which was reportedly normal results.  Will request records.      Vitamin D deficiency  History of vitamin D deficiency.  Controlled.  Taking supplemental vitamin D.  Vitamin D level is 45.    Palpitations  Chronic problem.  Was well controlled while taking metoprolol.  Patient discontinued metoprolol and started amlodipine over 6 months ago.  Patient reports intermittent episodes of palpitations now that taking amlodipine.  She would like to switch back to metoprolol extended release.  There is some concern that metoprolol in the past was causing hypotensive episodes.  Patient will continue to monitor.    Essential hypertension  Chronic health problem, moderately controlled.  Taking amlodipine 2.5 mg daily.  Blood pressure is mildly elevated today.  Patient brings in home readings that range from 120s-140s/70-85.    Patient discontinued metoprolol and started amlodipine over 6 months ago.  Patient reports intermittent episodes of palpitations now that taking amlodipine.  She would like to switch back to metoprolol extended release.  There is some concern that metoprolol in the past was causing hypotensive episodes.  Patient will continue to monitor.      Acquired hypothyroidism  This is a chronic health problem that is well controlled with current medications and lifestyle measures.  Taking levothyroxine 88 mcg daily.  Denies skin or hair changes, constipation, brain fog.  TSH well controlled.    Component      Latest Ref Rng & Units 1/23/2020           7:34 AM  "  TSH      0.380 - 5.330 uIU/mL 1.360       Elevated serum globulin level  Elevated serum globulin, increasing.  Has not had further lab work done.  Does have autoimmune hypothyroid and lupus anticoagulation syndrome.  Component      Latest Ref Rng & Units 2018 1/10/2019 2020           9:34 AM  7:26 AM  7:34 AM   Total Protein      6.0 - 8.2 g/dL 7.7 8.2 8.4 (H)   Globulin      1.9 - 3.5 g/dL 4.2 (H) 3.7 (H) 4.0 (H)       Family history of heart disease  Patient reports family history of heart disease in father and paternal grandmother.  Father had first heart attack in his 50s.  Also had a problem \"with 1 of his valves, which is what he  from.\"  Paternal grandmother had the same thing.  Patient would like to see cardiology to make sure she does not have any heart issues.      Allergies: Bactrim ds; Other misc; Sulfa drugs; Carrot [daucus carota]; Food; Other environmental; Pcn [penicillins]; Soap; and Terbinafine    Current Outpatient Medications Ordered in Epic   Medication Sig Dispense Refill   • metoprolol SR (TOPROL XL) 25 MG TABLET SR 24 HR Take 1 Tab by mouth every evening. 90 Tab 0   • citalopram (CELEXA) 10 MG tablet Take 10 mg by mouth every evening.     • levothyroxine (SYNTHROID) 88 MCG Tab Take 88 mcg by mouth Every morning on an empty stomach.     • Multiple Vitamins-Minerals (MULTIVITAMIN PO) Take 1 Tab by mouth every day.     • cetirizine (ZYRTEC ALLERGY) 10 MG Tab Take 10 mg by mouth as needed for Allergies.     • JENCYCLA 0.35 MG tablet Take 0.35 mg by mouth every evening.       No current Epic-ordered facility-administered medications on file.        Past Medical History:   Diagnosis Date   • Acid reflux    • Allergy    • Anxiety    • Arrhythmia     Sounds like PVC   • Asthma     Inhaler Use PRN   • Blood clotting disorder (HCC) 2019    Pt. states was DX with Lupus Anticoagulant During Pregnancy.   • Clotting disorder (HCC)    • Hypertension    • Lupus anticoagulant disorder " "([facility]) 09/06/2019    Pt. states H/O with pregnancies.   • Miscarriage     x3   Pt. states was DX with Lupus Anticoagulant.   • Palpitations     H/O   • Thyroid disease     was hyperthyroid and had radiation to thyroid       Past Surgical History:   Procedure Laterality Date   • PB INCIS TENDON SHEATH,RADIAL STYLOID Right 9/18/2019    Procedure: RELEASE, HAND, FOR DEQUERVAIN'S TENOSYNOVITIS;  Surgeon: Tl Greene M.D.;  Location: SURGERY [facility];  Service: Orthopedics   • GANGLION EXCISION Right 9/18/2019    Procedure: EXCISION, GANGLION CYST- WRIST;  Surgeon: Tl Greene M.D.;  Location: SURGERY [facility];  Service: Orthopedics   • DENTAL EXTRACTION(S)      wisdom teeth   • GYN SURGERY      d & C       Social History     Tobacco Use   • Smoking status: Never Smoker   • Smokeless tobacco: Never Used   Substance Use Topics   • Alcohol use: No   • Drug use: No       Family History   Problem Relation Age of Onset   • Heart Disease Father    • Alcohol/Drug Father    • Hyperlipidemia Father    • Hypertension Father        ROS:   As in HPI, otherwise negative for chest pain, dyspnea, abdominal pain, dysuria, blood in stool, fever          Exam: /74 (BP Location: Left arm, Patient Position: Sitting, BP Cuff Size: Adult)   Pulse 100   Temp 36.4 °C (97.6 °F)   Resp 14   Ht 1.702 m (5' 7\")   Wt 79.8 kg (176 lb)   SpO2 96%  Body mass index is 27.57 kg/m².    General: Alert, pleasant, well nourished, well developed female in NAD  Neck: Supple without bruit.   Pulmonary: Clear to ausculation.  Normal effort. No rales, ronchi, or wheezing.  Cardiovascular: Normal rate and rhythm without murmur. Carotid and radial pulses are intact and equal bilaterally.  No lower extremity edema.  Neurologic: Grossly nonfocal  Skin: Warm and dry.  No obvious lesions.  Musculoskeletal: Normal gait.   Psych: Normal mood and affect. Alert and oriented. Judgment and insight is normal.    Please note that " this dictation was created using voice recognition software. I have made every reasonable attempt to correct obvious errors, but I expect that there are errors of grammar and possibly content that I did not discover before finalizing the note.      Assessment/Plan  1. Elevated serum globulin level  Serum globulin may be elevated due to autoimmune disease.  Will get other labs to rule out lymphoma.  - SERUM PROTEIN ELECTROPHORESIS; Future  - IMMUNOGLOBULINS A/G/M SERUM; Future  - FREE K&L LT CHAINS, QT, SERUM; Future  - PROTEIN ELECTROPHORESIS 24 HR URINE; Future  - JAIRO REFLEXIVE PROFILE; Future    2. Family history of heart disease  Family history of heart disease in father and paternal grandmother.  Patient wanting further work-up.  Patient also has occasional palpitations, hypertension, lupus anticoagulant syndrome.  Will refer to cardiology.  - REFERRAL TO CARDIOLOGY    3. Essential hypertension  Patient will discontinue amlodipine and restart metoprolol SR 25 mg daily.  She will monitor blood pressure at home 3 times a week.  She will return to clinic sooner than scheduled appointment if blood pressures are consistently greater than 140/90.  - REFERRAL TO CARDIOLOGY  - metoprolol SR (TOPROL XL) 25 MG TABLET SR 24 HR; Take 1 Tab by mouth every evening.  Dispense: 90 Tab; Refill: 0    4. Vitamin D deficiency  Continue with supplemental vitamin D.    5. Lupus anticoagulant syndrome (HCC)    - REFERRAL TO CARDIOLOGY  - JAIRO REFLEXIVE PROFILE; Future    6. Palpitations  We will restart metoprolol.  Referral to cardiology.  - REFERRAL TO CARDIOLOGY    7. Acquired hypothyroidism  Clinically euthyroid.  TSH normal.  Continue with levothyroxine at current dose.    Patient will return to clinic in 6 weeks for hypertension and lab results or sooner if needed.

## 2020-01-29 ENCOUNTER — HOSPITAL ENCOUNTER (OUTPATIENT)
Dept: LAB | Facility: MEDICAL CENTER | Age: 50
End: 2020-01-29
Attending: NURSE PRACTITIONER
Payer: COMMERCIAL

## 2020-01-29 DIAGNOSIS — D68.62 LUPUS ANTICOAGULANT SYNDROME (HCC): ICD-10-CM

## 2020-01-29 DIAGNOSIS — R77.1 ELEVATED SERUM GLOBULIN LEVEL: ICD-10-CM

## 2020-01-29 PROCEDURE — 82784 ASSAY IGA/IGD/IGG/IGM EACH: CPT

## 2020-01-29 PROCEDURE — 84155 ASSAY OF PROTEIN SERUM: CPT

## 2020-01-29 PROCEDURE — 83883 ASSAY NEPHELOMETRY NOT SPEC: CPT

## 2020-01-29 PROCEDURE — 86038 ANTINUCLEAR ANTIBODIES: CPT

## 2020-01-29 PROCEDURE — 36415 COLL VENOUS BLD VENIPUNCTURE: CPT

## 2020-01-29 PROCEDURE — 86039 ANTINUCLEAR ANTIBODIES (ANA): CPT

## 2020-01-29 PROCEDURE — 86256 FLUORESCENT ANTIBODY TITER: CPT

## 2020-01-29 PROCEDURE — 84165 PROTEIN E-PHORESIS SERUM: CPT

## 2020-01-29 PROCEDURE — 86225 DNA ANTIBODY NATIVE: CPT

## 2020-01-29 PROCEDURE — 86235 NUCLEAR ANTIGEN ANTIBODY: CPT

## 2020-01-29 NOTE — ASSESSMENT & PLAN NOTE
"Patient reports family history of heart disease in father and paternal grandmother.  Father had first heart attack in his 50s.  Also had a problem \"with 1 of his valves, which is what he  from.\"  Paternal grandmother had the same thing.  Patient would like to see cardiology to make sure she does not have any heart issues.  "

## 2020-01-29 NOTE — ASSESSMENT & PLAN NOTE
Elevated serum globulin, increasing.  Has not had further lab work done.  Does have autoimmune hypothyroid and lupus anticoagulation syndrome.  Component      Latest Ref Rng & Units 7/9/2018 1/10/2019 1/23/2020           9:34 AM  7:26 AM  7:34 AM   Total Protein      6.0 - 8.2 g/dL 7.7 8.2 8.4 (H)   Globulin      1.9 - 3.5 g/dL 4.2 (H) 3.7 (H) 4.0 (H)

## 2020-01-29 NOTE — ASSESSMENT & PLAN NOTE
Chronic health problem, moderately controlled.  Taking amlodipine 2.5 mg daily.  Blood pressure is mildly elevated today.  Patient brings in home readings that range from 120s-140s/70-85.    Patient discontinued metoprolol and started amlodipine over 6 months ago.  Patient reports intermittent episodes of palpitations now that taking amlodipine.  She would like to switch back to metoprolol extended release.  There is some concern that metoprolol in the past was causing hypotensive episodes.  Patient will continue to monitor.

## 2020-01-29 NOTE — ASSESSMENT & PLAN NOTE
Chronic problem.  Was well controlled while taking metoprolol.  Patient discontinued metoprolol and started amlodipine over 6 months ago.  Patient reports intermittent episodes of palpitations now that taking amlodipine.  She would like to switch back to metoprolol extended release.  There is some concern that metoprolol in the past was causing hypotensive episodes.  Patient will continue to monitor.

## 2020-01-29 NOTE — ASSESSMENT & PLAN NOTE
History of vitamin D deficiency.  Controlled.  Taking supplemental vitamin D.  Vitamin D level is 45.

## 2020-01-29 NOTE — ASSESSMENT & PLAN NOTE
This is a chronic health problem that is well controlled with current medications and lifestyle measures.  Taking levothyroxine 88 mcg daily.  Denies skin or hair changes, constipation, brain fog.  TSH well controlled.    Component      Latest Ref Rng & Units 1/23/2020           7:34 AM   TSH      0.380 - 5.330 uIU/mL 1.360

## 2020-01-31 LAB
IGA SERPL-MCNC: 173 MG/DL (ref 68–408)
IGG SERPL-MCNC: 1600 MG/DL (ref 768–1632)
IGM SERPL-MCNC: 119 MG/DL (ref 35–263)
NUCLEAR IGG SER QL IA: DETECTED

## 2020-02-01 LAB
ANA INTERPRETIVE COMMENT Q5143: ABNORMAL
ANA PATTERN Q5144: ABNORMAL
ANA TITER Q5145: ABNORMAL
ANTINUCLEAR ANTIBODY (ANA), HEP-2, IGG Q5142: DETECTED

## 2020-02-02 LAB
ALBUMIN SERPL ELPH-MCNC: 4.3 G/DL (ref 3.75–5.01)
ALPHA1 GLOB SERPL ELPH-MCNC: 0.25 G/DL (ref 0.19–0.46)
ALPHA2 GLOB SERPL ELPH-MCNC: 0.75 G/DL (ref 0.48–1.05)
B-GLOBULIN SERPL ELPH-MCNC: 0.79 G/DL (ref 0.48–1.1)
DSDNA AB TITR SER CLIF: DETECTED {TITER}
EER PROT ELECT SER Q1092: ABNORMAL
ENA JO1 AB TITR SER: 0 AU/ML (ref 0–40)
ENA SCL70 IGG SER QL: 2 AU/ML (ref 0–40)
ENA SM IGG SER-ACNC: 2 AU/ML (ref 0–40)
ENA SS-B IGG SER IA-ACNC: 0 AU/ML (ref 0–40)
GAMMA GLOB SERPL ELPH-MCNC: 1.6 G/DL (ref 0.62–1.51)
INTERPRETATION SERPL IFE-IMP: ABNORMAL
PROT SERPL-MCNC: 7.7 G/DL (ref 6.3–8.2)
SSA52 R0ENA AB IGG Q0420: 1 AU/ML (ref 0–40)
SSA60 R0ENA AB IGG Q0419: 1 AU/ML (ref 0–40)
U1 SNRNP IGG SER QL: 6 AU/ML (ref 0–40)

## 2020-02-03 ENCOUNTER — HOSPITAL ENCOUNTER (OUTPATIENT)
Facility: MEDICAL CENTER | Age: 50
End: 2020-02-03
Attending: NURSE PRACTITIONER
Payer: COMMERCIAL

## 2020-02-03 ENCOUNTER — TELEPHONE (OUTPATIENT)
Dept: MEDICAL GROUP | Facility: PHYSICIAN GROUP | Age: 50
End: 2020-02-03

## 2020-02-03 DIAGNOSIS — R76.8 POSITIVE ANA (ANTINUCLEAR ANTIBODY): ICD-10-CM

## 2020-02-03 DIAGNOSIS — R77.1 ELEVATED SERUM GLOBULIN LEVEL: ICD-10-CM

## 2020-02-03 LAB
DSDNA IGG TITR SER CLIF: NORMAL {TITER}
KAPPA LC FREE SER-MCNC: 2.56 MG/DL (ref 0.33–1.94)
KAPPA LC FREE/LAMBDA FREE SER NEPH: 1.41 {RATIO} (ref 0.26–1.65)
LAMBDA LC FREE SERPL-MCNC: 1.82 MG/DL (ref 0.57–2.63)

## 2020-02-03 PROCEDURE — 84156 ASSAY OF PROTEIN URINE: CPT

## 2020-02-03 PROCEDURE — 83883 ASSAY NEPHELOMETRY NOT SPEC: CPT | Mod: 91

## 2020-02-06 LAB
ALBUMIN 24H UR QL ELPH: DETECTED
ALPHA1 GLOB 24H UR QL ELPH: ABNORMAL
ALPHA2 GLOB 24H UR QL ELPH: ABNORMAL
B-GLOBULIN UR QL ELPH: DETECTED
COLLECT DURATION TIME SPEC: 24 HRS
GAMMA GLOB UR QL ELPH: DETECTED
INTERPRETATION UR IFE-IMP: ABNORMAL
KAPPA LC FREE UR-MCNC: 0.6 MG/DL (ref 0.14–2.42)
KAPPA LC FREE/LAMBDA FREE UR: 20 RATIO (ref 2.04–10.37)
LAMBDA LC FREE UR-MCNC: 0.03 MG/DL (ref 0.02–0.67)
PROT 24H UR-MRATE: 14 MG/D (ref 10–140)
SPECIMEN VOL ?TM UR: 2300 ML

## 2020-02-10 ENCOUNTER — TELEPHONE (OUTPATIENT)
Dept: MEDICAL GROUP | Facility: PHYSICIAN GROUP | Age: 50
End: 2020-02-10

## 2020-02-10 DIAGNOSIS — Z82.49 FAMILY HISTORY OF HEART DISEASE: ICD-10-CM

## 2020-02-10 DIAGNOSIS — R00.2 PALPITATIONS: ICD-10-CM

## 2020-02-10 NOTE — TELEPHONE ENCOUNTER
"I received the following message from telemedicne :    \"The patient has be scheduled for a new patient telehealth visit with cardiology on 02/27/20 with Dr. NOBLE in [place].  Please order an EKG  And have patient complete 1-2 days prior to the visit or at the time of check in. Once the EKG is completed, please email or fax the report to [phone].\"    I have ordered EKG.  Please schedule patient.  "

## 2020-02-18 DIAGNOSIS — Z82.49 FAMILY HISTORY OF HEART DISEASE: ICD-10-CM

## 2020-02-18 NOTE — PROGRESS NOTES
EKG Order by Dr. Guerrero   Received: Today   Message Contents   Christie Whitley R.N.             Please place an EKG order for Telemed for Dr. Guerrero on 2/27/2020.  Thanks, Christie

## 2020-02-27 ENCOUNTER — NON-PROVIDER VISIT (OUTPATIENT)
Dept: MEDICAL GROUP | Facility: PHYSICIAN GROUP | Age: 50
End: 2020-02-27
Payer: COMMERCIAL

## 2020-02-27 ENCOUNTER — TELEMEDICINE2 (OUTPATIENT)
Dept: CARDIOLOGY | Facility: MEDICAL CENTER | Age: 50
End: 2020-02-27
Payer: COMMERCIAL

## 2020-02-27 VITALS
HEIGHT: 67 IN | WEIGHT: 178.1 LBS | SYSTOLIC BLOOD PRESSURE: 132 MMHG | OXYGEN SATURATION: 96 % | DIASTOLIC BLOOD PRESSURE: 84 MMHG | BODY MASS INDEX: 27.95 KG/M2 | TEMPERATURE: 100.1 F | HEART RATE: 83 BPM

## 2020-02-27 DIAGNOSIS — R00.2 PALPITATIONS: ICD-10-CM

## 2020-02-27 DIAGNOSIS — D68.62 LUPUS ANTICOAGULANT SYNDROME (HCC): ICD-10-CM

## 2020-02-27 DIAGNOSIS — Z91.89 10 YEAR RISK OF MI OR STROKE < 7.5%: ICD-10-CM

## 2020-02-27 DIAGNOSIS — I10 ESSENTIAL HYPERTENSION: ICD-10-CM

## 2020-02-27 PROCEDURE — 99204 OFFICE O/P NEW MOD 45 MIN: CPT | Performed by: INTERNAL MEDICINE

## 2020-02-27 PROCEDURE — 93000 ELECTROCARDIOGRAM COMPLETE: CPT | Performed by: NURSE PRACTITIONER

## 2020-02-27 ASSESSMENT — ENCOUNTER SYMPTOMS
WEIGHT GAIN: 0
PND: 0
NEAR-SYNCOPE: 0
COUGH: 0
DIARRHEA: 0
BACK PAIN: 0
BLURRED VISION: 0
HEARTBURN: 0
DECREASED APPETITE: 0
ALTERED MENTAL STATUS: 0
PALPITATIONS: 0
SHORTNESS OF BREATH: 0
CONSTIPATION: 0
CLAUDICATION: 0
VOMITING: 0
ORTHOPNEA: 0
DYSPNEA ON EXERTION: 0
SYNCOPE: 0
IRREGULAR HEARTBEAT: 0
NAUSEA: 0
DIZZINESS: 0
FEVER: 0
ABDOMINAL PAIN: 0
DEPRESSION: 0
WEIGHT LOSS: 0
FLANK PAIN: 0

## 2020-02-27 NOTE — PROGRESS NOTES
Cardiology Note    Chief Complaint   Patient presents with   • Palpitations       History of Present Illness: Penny Todd is a 49 y.o. female PMH hypothyroidism, HTN, lupus anticoagulant without thrombus who presents for initial visit.    Main concern is HTN. Previously tried amlodipine.  Now on metop. She is unsure what her target should be. She has not had recent lipids done, but did in 1/2019 where her 10 year risk of MI/CVA was <2%.     Adds that when was pregnant, lost fetus and she was discovered to have lupus anticoagulant. Never had thrombosis issues outside of pregnancy.     Denies any active cardiac complaints today.    Review of Systems   Constitution: Negative for decreased appetite, fever, malaise/fatigue, weight gain and weight loss.   HENT: Negative for congestion and nosebleeds.    Eyes: Negative for blurred vision.   Cardiovascular: Negative for chest pain, claudication, dyspnea on exertion, irregular heartbeat, leg swelling, near-syncope, orthopnea, palpitations, paroxysmal nocturnal dyspnea and syncope.   Respiratory: Negative for cough and shortness of breath.    Endocrine: Negative for cold intolerance and heat intolerance.   Skin: Negative for rash.   Musculoskeletal: Negative for back pain.   Gastrointestinal: Negative for abdominal pain, constipation, diarrhea, heartburn, melena, nausea and vomiting.   Genitourinary: Negative for dysuria, flank pain and hematuria.   Neurological: Negative for dizziness.   Psychiatric/Behavioral: Negative for altered mental status and depression.         Past Medical History:   Diagnosis Date   • Acid reflux    • Allergy    • Anxiety    • Arrhythmia     Sounds like PVC   • Asthma     Inhaler Use PRN   • Blood clotting disorder (HCC) 09/06/2019    Pt. states was DX with Lupus Anticoagulant During Pregnancy.   • Clotting disorder ([facility])    • Hypertension    • Lupus anticoagulant disorder (HCC) 09/06/2019    Pt. states H/O with pregnancies.   •  "Miscarriage     x3   Pt. states was DX with Lupus Anticoagulant.   • Palpitations     H/O   • Thyroid disease     was hyperthyroid and had radiation to thyroid         Past Surgical History:   Procedure Laterality Date   • PB INCIS TENDON SHEATH,RADIAL STYLOID Right 9/18/2019    Procedure: RELEASE, HAND, FOR DEQUERVAIN'S TENOSYNOVITIS;  Surgeon: Tl Greene M.D.;  Location: SURGERY [facility];  Service: Orthopedics   • GANGLION EXCISION Right 9/18/2019    Procedure: EXCISION, GANGLION CYST- WRIST;  Surgeon: Tl Greene M.D.;  Location: SURGERY [facility];  Service: Orthopedics   • DENTAL EXTRACTION(S)      wisdom teeth   • GYN SURGERY      d & C         Current Outpatient Medications   Medication Sig Dispense Refill   • NON SPECIFIED Indications: circulation and vein support supplement     • citalopram (CELEXA) 10 MG tablet Take 10 mg by mouth every evening.     • levothyroxine (SYNTHROID) 88 MCG Tab Take 88 mcg by mouth Every morning on an empty stomach.     • Multiple Vitamins-Minerals (MULTIVITAMIN PO) Take 1 Tab by mouth every day.     • cetirizine (ZYRTEC ALLERGY) 10 MG Tab Take 10 mg by mouth as needed for Allergies.     • JENCYCLA 0.35 MG tablet Take 0.35 mg by mouth every evening.       No current facility-administered medications for this visit.          Allergies   Allergen Reactions   • Bactrim Ds Anaphylaxis   • Other Misc Anaphylaxis     CILANTRO   • Sulfa Drugs Anaphylaxis   • Carrot [Daucus Carota]      Pt reports she gets a bubble in her throat can't breath      • Food Shortness of Breath and Itching     Cilantro, pecans, walnuts, carrots, bananas, cantaloupe   • Other Environmental Runny Nose and Itching     \"Cat Hair, all Trees, Cows & sagebrush.\"    • Pcn [Penicillins] Rash     Rash   • Soap Rash     Dial, get a rash   • Terbinafine          Family History   Problem Relation Age of Onset   • Heart Disease Father    • Alcohol/Drug Father    • Hyperlipidemia Father    • " "Hypertension Father          Social History     Socioeconomic History   • Marital status:      Spouse name: Not on file   • Number of children: Not on file   • Years of education: Not on file   • Highest education level: Not on file   Occupational History   • Not on file   Social Needs   • Financial resource strain: Not on file   • Food insecurity     Worry: Not on file     Inability: Not on file   • Transportation needs     Medical: Not on file     Non-medical: Not on file   Tobacco Use   • Smoking status: Never Smoker   • Smokeless tobacco: Never Used   Substance and Sexual Activity   • Alcohol use: No   • Drug use: No   • Sexual activity: Yes     Partners: Male     Birth control/protection: Pill   Lifestyle   • Physical activity     Days per week: Not on file     Minutes per session: Not on file   • Stress: Not on file   Relationships   • Social connections     Talks on phone: Not on file     Gets together: Not on file     Attends Sabianist service: Not on file     Active member of club or organization: Not on file     Attends meetings of clubs or organizations: Not on file     Relationship status: Not on file   • Intimate partner violence     Fear of current or ex partner: Not on file     Emotionally abused: Not on file     Physically abused: Not on file     Forced sexual activity: Not on file   Other Topics Concern   • Not on file   Social History Narrative   • Not on file         Physical Exam:  Ambulatory Vitals  /84 (BP Location: Right arm, Patient Position: Sitting, BP Cuff Size: Adult)   Pulse 83   Temp 37.8 °C (100.1 °F) (Temporal)   Ht 1.702 m (5' 7\")   Wt 80.8 kg (178 lb 1.6 oz)   SpO2 96%    BP Readings from Last 4 Encounters:   02/27/20 132/84   01/28/20 144/74   09/18/19 148/81   07/25/19 136/72     Weight/BMI:   Vitals:    02/27/20 1350   BP: 132/84   Weight: 80.8 kg (178 lb 1.6 oz)   Height: 1.702 m (5' 7\")    Body mass index is 27.89 kg/m².  Wt Readings from Last 4 Encounters: "   02/27/20 80.8 kg (178 lb 1.6 oz)   01/28/20 79.8 kg (176 lb)   09/18/19 79.4 kg (175 lb 0.7 oz)   07/25/19 77.6 kg (171 lb)       Physical Exam   Constitutional: She is oriented to person, place, and time and well-developed, well-nourished, and in no distress. No distress.   HENT:   Head: Normocephalic and atraumatic.   Eyes: Pupils are equal, round, and reactive to light. Conjunctivae are normal.   Neck: Normal range of motion. Neck supple. No JVD present.   Cardiovascular: Normal rate, regular rhythm, normal heart sounds and intact distal pulses. Exam reveals no gallop and no friction rub.   No murmur heard.  Pulmonary/Chest: Effort normal and breath sounds normal. No respiratory distress. She has no wheezes. She has no rales. She exhibits no tenderness.   Abdominal: Soft. Bowel sounds are normal. She exhibits no distension.   Musculoskeletal:         General: No edema.   Neurological: She is alert and oriented to person, place, and time.   Skin: Skin is warm and dry.   Psychiatric: Affect and judgment normal.       Lab Data Review:  Lab Results   Component Value Date/Time    CHOLSTRLTOT 155 01/10/2019 07:26 AM     (H) 01/10/2019 07:26 AM    HDL 38 (A) 01/10/2019 07:26 AM    TRIGLYCERIDE 84 01/10/2019 07:26 AM       Lab Results   Component Value Date/Time    SODIUM 141 01/23/2020 07:34 AM    POTASSIUM 4.2 01/23/2020 07:34 AM    CHLORIDE 104 01/23/2020 07:34 AM    CO2 27 01/23/2020 07:34 AM    GLUCOSE 91 01/23/2020 07:34 AM    BUN 15 01/23/2020 07:34 AM    CREATININE 0.88 01/23/2020 07:34 AM     CrCl cannot be calculated (Patient's most recent lab result is older than the maximum 7 days allowed.).  Lab Results   Component Value Date/Time    ALKPHOSPHAT 76 01/23/2020 07:34 AM    ASTSGOT 18 01/23/2020 07:34 AM    ALTSGPT 18 01/23/2020 07:34 AM    TBILIRUBIN 0.6 01/23/2020 07:34 AM      Lab Results   Component Value Date/Time    WBC 6.2 01/23/2020 07:34 AM     No results found for: HBA1C  No components found  for: TROP      Cardiac Imaging and Procedures Review:      EKG 2/27/20 normal sinus    Medical Decision Making:  Problem List Items Addressed This Visit     Lupus anticoagulant syndrome (HCC)    Essential hypertension    Palpitations    10 year risk of MI or stroke < 7.5%    Relevant Orders    LIPID PANEL        Recheck annual lipids.     Keep track of BP and home and record in log. Check TTE. Goal <140/90 given 10 year risk mi/cva <10%. If ever should increase past this can start single agent.    If palpitations become bothersome can check zio monitor.    If should develop thrombus, only anticoagulant option is coumadin setting of lupus anticoagulant.    It was my pleasure to meet with Ms. Todd.

## 2020-03-02 ENCOUNTER — TELEPHONE (OUTPATIENT)
Dept: CARDIOLOGY | Facility: MEDICAL CENTER | Age: 50
End: 2020-03-02

## 2020-03-04 DIAGNOSIS — I10 ESSENTIAL HYPERTENSION: ICD-10-CM

## 2020-03-15 DIAGNOSIS — F41.1 GENERALIZED ANXIETY DISORDER: ICD-10-CM

## 2020-03-15 DIAGNOSIS — E03.9 ACQUIRED HYPOTHYROIDISM: ICD-10-CM

## 2020-03-16 RX ORDER — LEVOTHYROXINE SODIUM 88 UG/1
TABLET ORAL
Qty: 90 TAB | Refills: 2 | Status: SHIPPED | OUTPATIENT
Start: 2020-03-16 | End: 2020-12-03

## 2020-03-16 RX ORDER — CITALOPRAM HYDROBROMIDE 10 MG/1
10 TABLET ORAL EVERY EVENING
Qty: 90 TAB | Refills: 3 | Status: SHIPPED | OUTPATIENT
Start: 2020-03-16 | End: 2021-01-25

## 2020-03-16 NOTE — TELEPHONE ENCOUNTER
Received request via: Pharmacy    Was the patient seen in the last year in this department? Yes    Does the patient have an active prescription (recently filled or refills available) for medication(s) requested? No     Last OV 1/28/20 last labs 1/29/20

## 2020-03-18 RX ORDER — CHLORTHALIDONE 25 MG/1
25 TABLET ORAL DAILY
Qty: 90 TAB | Refills: 3 | Status: SHIPPED | OUTPATIENT
Start: 2020-03-18 | End: 2020-12-21

## 2020-04-09 ENCOUNTER — HOSPITAL ENCOUNTER (OUTPATIENT)
Dept: CARDIOLOGY | Facility: MEDICAL CENTER | Age: 50
End: 2020-04-09
Attending: INTERNAL MEDICINE
Payer: COMMERCIAL

## 2020-04-09 DIAGNOSIS — I10 ESSENTIAL HYPERTENSION: ICD-10-CM

## 2020-04-09 PROCEDURE — 93306 TTE W/DOPPLER COMPLETE: CPT

## 2020-04-10 LAB
LV EJECT FRACT MOD 2C 99903: 60.75
LV EJECT FRACT MOD 4C 99902: 60.95
LV EJECT FRACT MOD BP 99901: 60.32

## 2020-04-10 PROCEDURE — 93306 TTE W/DOPPLER COMPLETE: CPT | Mod: 26 | Performed by: INTERNAL MEDICINE

## 2020-09-21 NOTE — ANESTHESIA PREPROCEDURE EVALUATION
Relevant Problems   PULMONARY   (+) Mild intermittent asthma without complication      CARDIAC   (+) Essential hypertension      ENDO   (+) Acquired hypothyroidism       Physical Exam    Airway   Mallampati: II  TM distance: >3 FB  Neck ROM: full       Cardiovascular - normal exam  Rhythm: regular  Rate: normal  (-) murmur     Dental - normal exam         Pulmonary - normal exam  Breath sounds clear to auscultation     Abdominal    Neurological - normal exam                 Anesthesia Plan    ASA 2       Plan - general       Airway plan will be LMA        Induction: intravenous    Postoperative Plan: Postoperative administration of opioids is intended.    Pertinent diagnostic labs and testing reviewed    Informed Consent:    Anesthetic plan and risks discussed with patient.    Use of blood products discussed with: patient whom consented to blood products.          Requests wellness check

## 2020-09-24 NOTE — OP REPORT
DATE OF SERVICE:  09/18/2019    SURGEON:  Tl Loaiza MD    ASSISTANT:  None.    PREOPERATIVE DIAGNOSES:  1.  Right wrist de Quervain's tenosynovitis.  2.  Right wrist ganglion.    POSTOPERATIVE DIAGNOSES:  1.  Right wrist de Quervain's tenosynovitis.  2.  Right wrist ganglion.    PROCEDURES PERFORMED:  1.  Right wrist abductor pollicis longus tenolysis.  2.  Right wrist extensor pollicis brevis tenolysis.  3.  Right wrist ganglionectomy.    ANESTHESIOLOGIST:  Lennox Finley MD    ANESTHESIA:  General.    COMPLICATIONS:  None noted.    DRAINS:  None.    SPECIMENS:  Mass x1.    ESTIMATED BLOOD LOSS:  Minimal.    TOURNIQUET TIME:  Less than 15 minutes at 250 mmHg.    INDICATIONS:  The patient is a 49-year-old female well known to me through my   outpatient clinic for the above-mentioned diagnosis.  She believes and agrees   she has failed conservative treatment and is a candidate for the   above-mentioned procedure.  Prior to the procedure, she understood the risks,   benefits and alternatives to surgery.  She understood the risks to include,   but not be limited to the risk of infection requiring repeat surgery, bleeding   requiring blood transfusion, nerve, blood vessel, tendon injury requiring   repair, chronic pain, failure to alleviate or worsening of her symptoms   requiring revision surgery, DVT, pulmonary embolism, heart attack, stroke, and   even death.  The patient states despite these risks, she wished to proceed   with surgery.    DESCRIPTION OF PROCEDURE:  The patient was met in the preoperative holding   area.  Right wrist was initialed as correct operative site.  She had an   opportunity to ask questions, all questions were answered and informed consent   was obtained.    The patient was brought to the operating room and laid supine on the operating   room table.  All bony and dependent prominences were well padded.  General   anesthesia was induced without any known complication.  Ancef was  The patient is a 58y Male complaining of abdominal pain. administered   for infection prophylaxis.  Right upper extremity was prepped and draped in   the usual sterile fashion.  A formal time-out was performed, in which all   parties agreed upon the correct patient, procedure, and operative site.  I   began the procedure by making an oblique incision over the first dorsal   extensor compartment, ganglion, dissected down to subcutaneous tissues,   identified and protected dorsal sensory nerves.  I dissected the ganglion off   the adjacent tissues.  It was fluid filled emanating from the first dorsal   extensor compartment.  I removed the stalk and sheath of the extensor   retinaculum and sent it for pathology.  I then noticed abductor pollicis   longus, had multiple slips.  I performed a thorough tenolysis of this tendon   debriding tenosynovium, extensor pollicis brevis was in its own separate   subsheath, which was incised.  All the incisions in the first dorsal extensor   retinaculum were made dorsal to help prevent volar subluxation of the tendons.    I then deflated the tourniquet, used Bovie cautery to achieve hemostasis,   copiously irrigated the wound with normal saline, closed the incision with 3-0   Monocryl suture, covered with sterile benzoin, Steri-Strips, Xeroform, 4x4s,   and a well-padded splint.  The patient awoke from anesthesia without any known   complication, was transferred in a stable condition to PACU where there were   no immediate postoperative complaints.    POSTOPERATIVE PLAN:  The patient will be discharged home per same day   criteria, sedentary use, and follow up in the clinic in 10-14 days for a wound   check.       ____________________________________     MD ANGELINE Wilson / AARON    DD:  09/18/2019 12:48:54  DT:  09/18/2019 16:18:22    D#:  [id number]  Job#:  [id number]

## 2020-10-16 ENCOUNTER — OFFICE VISIT (OUTPATIENT)
Dept: URGENT CARE | Facility: PHYSICIAN GROUP | Age: 50
End: 2020-10-16
Payer: COMMERCIAL

## 2020-10-16 VITALS
DIASTOLIC BLOOD PRESSURE: 84 MMHG | WEIGHT: 178 LBS | HEIGHT: 67 IN | SYSTOLIC BLOOD PRESSURE: 138 MMHG | HEART RATE: 93 BPM | TEMPERATURE: 97.7 F | OXYGEN SATURATION: 96 % | RESPIRATION RATE: 14 BRPM | BODY MASS INDEX: 27.94 KG/M2
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
## 2020-10-16 DIAGNOSIS — M54.2 NECK PAIN, ACUTE: ICD-10-CM

## 2020-10-16 PROCEDURE — 99214 OFFICE O/P EST MOD 30 MIN: CPT | Performed by: NURSE PRACTITIONER

## 2020-10-16 RX ORDER — CYCLOBENZAPRINE HCL 10 MG
10 TABLET ORAL
Qty: 10 TAB | Refills: 0 | Status: SHIPPED | OUTPATIENT
Start: 2020-10-16 | End: 2020-10-26

## 2020-10-16 ASSESSMENT — ENCOUNTER SYMPTOMS
NUMBNESS: 0
FEVER: 0
TINGLING: 0
TROUBLE SWALLOWING: 0
HEADACHES: 0
CHILLS: 0
WEAKNESS: 0
NECK PAIN: 1

## 2020-10-16 ASSESSMENT — FIBROSIS 4 INDEX: FIB4 SCORE: 0.8
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
## 2020-12-02 DIAGNOSIS — E03.9 ACQUIRED HYPOTHYROIDISM: ICD-10-CM

## 2020-12-03 RX ORDER — LEVOTHYROXINE SODIUM 88 UG/1
TABLET ORAL
Qty: 90 TAB | Refills: 0 | Status: SHIPPED | OUTPATIENT
Start: 2020-12-03 | End: 2021-01-25

## 2020-12-19 DIAGNOSIS — I10 ESSENTIAL HYPERTENSION: ICD-10-CM

## 2020-12-21 RX ORDER — CHLORTHALIDONE 25 MG/1
TABLET ORAL
Qty: 90 TAB | Refills: 0 | Status: SHIPPED | OUTPATIENT
Start: 2020-12-21 | End: 2021-03-08 | Stop reason: SDUPTHER

## 2021-01-22 DIAGNOSIS — E03.9 ACQUIRED HYPOTHYROIDISM: ICD-10-CM

## 2021-01-22 DIAGNOSIS — F41.1 GENERALIZED ANXIETY DISORDER: ICD-10-CM

## 2021-01-22 DIAGNOSIS — I10 ESSENTIAL HYPERTENSION: ICD-10-CM

## 2021-01-25 RX ORDER — CITALOPRAM HYDROBROMIDE 10 MG/1
TABLET ORAL
Qty: 90 TAB | Refills: 0 | Status: SHIPPED | OUTPATIENT
Start: 2021-01-25 | End: 2021-03-16 | Stop reason: SDUPTHER

## 2021-01-25 RX ORDER — LEVOTHYROXINE SODIUM 88 UG/1
TABLET ORAL
Qty: 90 TAB | Refills: 0 | Status: SHIPPED | OUTPATIENT
Start: 2021-01-25 | End: 2021-03-16 | Stop reason: SDUPTHER

## 2021-01-25 NOTE — TELEPHONE ENCOUNTER
Received request via: Pharmacy    Was the patient seen in the last year in this department? Yes    Does the patient have an active prescription (recently filled or refills available) for medication(s) requested? No    Seen 1/28/20

## 2021-03-04 ENCOUNTER — HOSPITAL ENCOUNTER (OUTPATIENT)
Dept: LAB | Facility: MEDICAL CENTER | Age: 51
End: 2021-03-04
Attending: NURSE PRACTITIONER
Payer: COMMERCIAL

## 2021-03-04 DIAGNOSIS — E03.9 ACQUIRED HYPOTHYROIDISM: ICD-10-CM

## 2021-03-04 DIAGNOSIS — I10 ESSENTIAL HYPERTENSION: ICD-10-CM

## 2021-03-04 PROCEDURE — 36415 COLL VENOUS BLD VENIPUNCTURE: CPT

## 2021-03-04 PROCEDURE — 80053 COMPREHEN METABOLIC PANEL: CPT

## 2021-03-04 PROCEDURE — 84443 ASSAY THYROID STIM HORMONE: CPT

## 2021-03-04 PROCEDURE — 80061 LIPID PANEL: CPT

## 2021-03-05 ENCOUNTER — PATIENT MESSAGE (OUTPATIENT)
Dept: CARDIOLOGY | Facility: MEDICAL CENTER | Age: 51
End: 2021-03-05

## 2021-03-05 DIAGNOSIS — E78.00 HYPERCHOLESTEREMIA: ICD-10-CM

## 2021-03-05 DIAGNOSIS — I10 ESSENTIAL HYPERTENSION: ICD-10-CM

## 2021-03-05 LAB
ALBUMIN SERPL BCP-MCNC: 4.4 G/DL (ref 3.2–4.9)
ALBUMIN/GLOB SERPL: 1.2 G/DL
ALP SERPL-CCNC: 84 U/L (ref 30–99)
ALT SERPL-CCNC: 21 U/L (ref 2–50)
ANION GAP SERPL CALC-SCNC: 10 MMOL/L (ref 7–16)
AST SERPL-CCNC: 19 U/L (ref 12–45)
BILIRUB SERPL-MCNC: 0.4 MG/DL (ref 0.1–1.5)
BUN SERPL-MCNC: 14 MG/DL (ref 8–22)
CALCIUM SERPL-MCNC: 9.9 MG/DL (ref 8.5–10.5)
CHLORIDE SERPL-SCNC: 100 MMOL/L (ref 96–112)
CHOLEST SERPL-MCNC: 160 MG/DL (ref 100–199)
CO2 SERPL-SCNC: 30 MMOL/L (ref 20–33)
CREAT SERPL-MCNC: 0.73 MG/DL (ref 0.5–1.4)
FASTING STATUS PATIENT QL REPORTED: NORMAL
GLOBULIN SER CALC-MCNC: 3.6 G/DL (ref 1.9–3.5)
GLUCOSE SERPL-MCNC: 98 MG/DL (ref 65–99)
HDLC SERPL-MCNC: 37 MG/DL
LDLC SERPL CALC-MCNC: 109 MG/DL
POTASSIUM SERPL-SCNC: 3.3 MMOL/L (ref 3.6–5.5)
PROT SERPL-MCNC: 8 G/DL (ref 6–8.2)
SODIUM SERPL-SCNC: 140 MMOL/L (ref 135–145)
TRIGL SERPL-MCNC: 72 MG/DL (ref 0–149)
TSH SERPL DL<=0.005 MIU/L-ACNC: 0.61 UIU/ML (ref 0.38–5.33)

## 2021-03-08 RX ORDER — CHLORTHALIDONE 25 MG/1
TABLET ORAL
Qty: 90 TABLET | Refills: 0 | Status: SHIPPED | OUTPATIENT
Start: 2021-03-08 | End: 2021-07-06

## 2021-03-08 NOTE — PATIENT COMMUNICATION
You  Zane Guerrero M.D. 55 minutes ago (9:23 AM)     I can place the nutritionist referral, but do you still want to check in with pt on 3/24 or are you ok with her cancelling?      Zane Guerrero M.D.  You 8 minutes ago (10:11 AM)     Yea that sounds ok. Lets see her in one year though. Thanks Evette!      Misread previous appt date. Pt hasn't been seen since 2/27/2020 and is already due for one year f/u.

## 2021-03-24 ENCOUNTER — TELEMEDICINE (OUTPATIENT)
Dept: CARDIOLOGY | Facility: MEDICAL CENTER | Age: 51
End: 2021-03-24
Payer: COMMERCIAL

## 2021-03-24 VITALS
DIASTOLIC BLOOD PRESSURE: 87 MMHG | HEIGHT: 67 IN | HEART RATE: 84 BPM | SYSTOLIC BLOOD PRESSURE: 131 MMHG | WEIGHT: 170 LBS | BODY MASS INDEX: 26.68 KG/M2

## 2021-03-24 DIAGNOSIS — Z91.89 10 YEAR RISK OF MI OR STROKE < 7.5%: ICD-10-CM

## 2021-03-24 DIAGNOSIS — I10 ESSENTIAL HYPERTENSION: ICD-10-CM

## 2021-03-24 PROCEDURE — 99214 OFFICE O/P EST MOD 30 MIN: CPT | Mod: 95 | Performed by: INTERNAL MEDICINE

## 2021-03-24 RX ORDER — POTASSIUM CHLORIDE 20 MEQ/1
20 TABLET, EXTENDED RELEASE ORAL DAILY
Qty: 90 TABLET | Refills: 3 | Status: SHIPPED | OUTPATIENT
Start: 2021-03-24 | End: 2022-04-12

## 2021-03-24 ASSESSMENT — ENCOUNTER SYMPTOMS
WEIGHT GAIN: 0
COUGH: 0
VOMITING: 0
ABDOMINAL PAIN: 0
DIARRHEA: 0
CLAUDICATION: 0
DIZZINESS: 0
CONSTIPATION: 0
ALTERED MENTAL STATUS: 0
SHORTNESS OF BREATH: 0
PND: 0
IRREGULAR HEARTBEAT: 0
PALPITATIONS: 0
WEIGHT LOSS: 0
DEPRESSION: 0
FEVER: 0
BACK PAIN: 0
ORTHOPNEA: 0
NEAR-SYNCOPE: 0
NAUSEA: 0
SYNCOPE: 0
DYSPNEA ON EXERTION: 0
HEARTBURN: 0
FLANK PAIN: 0
DECREASED APPETITE: 0
BLURRED VISION: 0

## 2021-03-24 ASSESSMENT — FIBROSIS 4 INDEX: FIB4 SCORE: 0.78

## 2021-03-24 NOTE — PROGRESS NOTES
This encounter was conducted via ZOOM.  Verbal consent was obtained. Patient's identity was verified.    Cardiology Note    Chief Complaint   Patient presents with   • Hypertension     Essential hypertension   • Palpitations       History of Present Illness: Penny Todd is a 50 y.o. female PMH hypothyroidism, HTN, lupus anticoagulant without thrombus who presents for initial visit.    Doing well this visit. No active cardiac complaints. She noticed on her labs that potassium was lower than usual. This has caused leg cramping. When supplemented, this symptom improved. She states allergic to bananas. Home blood pressure better controlled on thiazide diuretic.     Previously noted: Previously tried amlodipine.  Then metop. Was not clear on BP target. 1/2019 her 10 year risk of ascvd was <2%. When was pregnant, lost fetus and she was discovered to have lupus anticoagulant. Never had thrombosis issues outside of pregnancy. No toxic social habits.    Review of Systems   Constitution: Negative for decreased appetite, fever, malaise/fatigue, weight gain and weight loss.   HENT: Negative for congestion and nosebleeds.    Eyes: Negative for blurred vision.   Cardiovascular: Negative for chest pain, claudication, dyspnea on exertion, irregular heartbeat, leg swelling, near-syncope, orthopnea, palpitations, paroxysmal nocturnal dyspnea and syncope.   Respiratory: Negative for cough and shortness of breath.    Endocrine: Negative for cold intolerance and heat intolerance.   Skin: Negative for rash.   Musculoskeletal: Negative for back pain.   Gastrointestinal: Negative for abdominal pain, constipation, diarrhea, heartburn, melena, nausea and vomiting.   Genitourinary: Negative for dysuria, flank pain and hematuria.   Neurological: Negative for dizziness.   Psychiatric/Behavioral: Negative for altered mental status and depression.         Past Medical History:   Diagnosis Date   • Acid reflux    • Allergy    • Anxiety     • Arrhythmia     Sounds like PVC   • Asthma     Inhaler Use PRN   • Blood clotting disorder (HCC) 09/06/2019    Pt. states was DX with Lupus Anticoagulant During Pregnancy.   • Clotting disorder (HCC)    • Hypertension    • Lupus anticoagulant disorder (HCC) 09/06/2019    Pt. states H/O with pregnancies.   • Miscarriage     x3   Pt. states was DX with Lupus Anticoagulant.   • Palpitations     H/O   • Thyroid disease     was hyperthyroid and had radiation to thyroid         Past Surgical History:   Procedure Laterality Date   • PB INCIS TENDON SHEATH,RADIAL STYLOID Right 9/18/2019    Procedure: RELEASE, HAND, FOR DEQUERVAIN'S TENOSYNOVITIS;  Surgeon: Tl Greene M.D.;  Location: SURGERY [facility];  Service: Orthopedics   • GANGLION EXCISION Right 9/18/2019    Procedure: EXCISION, GANGLION CYST- WRIST;  Surgeon: Tl Greene M.D.;  Location: SURGERY [facility];  Service: Orthopedics   • DENTAL EXTRACTION(S)      wisdom teeth   • GYN SURGERY      d & C         Current Outpatient Medications   Medication Sig Dispense Refill   • potassium chloride SA (KDUR) 20 MEQ Tab CR Take 1 tablet by mouth every day. 90 tablet 3   • levothyroxine (EUTHYROX) 88 MCG Tab TAKE 1 TABLET BY MOUTH ONCE DAILY IN THE MORNING ON AN EMPTY STOMACH 90 tablet 3   • citalopram (CELEXA) 10 MG tablet TAKE 1 TABLET BY MOUTH ONCE DAILY IN THE EVENING 90 tablet 3   • chlorthalidone (HYGROTON) 25 MG Tab Take 1 tablet by mouth once daily 90 tablet 0   • NON SPECIFIED Indications: circulation and vein support supplement     • Multiple Vitamins-Minerals (MULTIVITAMIN PO) Take 1 Tab by mouth every day.     • cetirizine (ZYRTEC ALLERGY) 10 MG Tab Take 10 mg by mouth as needed for Allergies.     • JENCYCLA 0.35 MG tablet Take 0.35 mg by mouth every evening.       No current facility-administered medications for this visit.         Allergies   Allergen Reactions   • Bactrim Ds Anaphylaxis   • Other Misc Anaphylaxis     CILANTRO  "  • Sulfa Drugs Anaphylaxis   • Carrot [Daucus Carota]      Pt reports she gets a bubble in her throat can't breath      • Food Shortness of Breath and Itching     Cilantro, pecans, walnuts, carrots, bananas, cantaloupe   • Other Environmental Runny Nose and Itching     \"Cat Hair, all Trees, Cows & sagebrush.\"    • Pcn [Penicillins] Rash     Rash   • Soap Rash     Dial, get a rash   • Terbinafine          Family History   Problem Relation Age of Onset   • Heart Disease Father    • Alcohol/Drug Father    • Hyperlipidemia Father    • Hypertension Father          Social History     Socioeconomic History   • Marital status:      Spouse name: Not on file   • Number of children: Not on file   • Years of education: Not on file   • Highest education level: Not on file   Occupational History   • Not on file   Tobacco Use   • Smoking status: Never Smoker   • Smokeless tobacco: Never Used   Substance and Sexual Activity   • Alcohol use: No   • Drug use: No   • Sexual activity: Yes     Partners: Male     Birth control/protection: Pill   Other Topics Concern   • Not on file   Social History Narrative   • Not on file     Social Determinants of Health     Financial Resource Strain:    • Difficulty of Paying Living Expenses:    Food Insecurity:    • Worried About Running Out of Food in the Last Year:    • Ran Out of Food in the Last Year:    Transportation Needs:    • Lack of Transportation (Medical):    • Lack of Transportation (Non-Medical):    Physical Activity:    • Days of Exercise per Week:    • Minutes of Exercise per Session:    Stress:    • Feeling of Stress :    Social Connections:    • Frequency of Communication with Friends and Family:    • Frequency of Social Gatherings with Friends and Family:    • Attends Protestant Services:    • Active Member of Clubs or Organizations:    • Attends Club or Organization Meetings:    • Marital Status:    Intimate Partner Violence:    • Fear of Current or Ex-Partner:    • " "Emotionally Abused:    • Physically Abused:    • Sexually Abused:          Physical Exam:  Ambulatory Vitals  /87 (BP Location: Left arm, Patient Position: Sitting, BP Cuff Size: Adult)   Pulse 84   Ht 1.702 m (5' 7\")   Wt 77.1 kg (170 lb)    BP Readings from Last 4 Encounters:   03/24/21 131/87   03/16/21 126/87   10/16/20 138/84   02/27/20 132/84     Weight/BMI:   Vitals:    03/24/21 1035   BP: 131/87   Weight: 77.1 kg (170 lb)   Height: 1.702 m (5' 7\")    Body mass index is 26.63 kg/m².  Wt Readings from Last 4 Encounters:   03/24/21 77.1 kg (170 lb)   03/16/21 77.1 kg (170 lb)   10/16/20 80.7 kg (178 lb)   02/27/20 80.8 kg (178 lb 1.6 oz)       Physical Exam   Physical Exam:  Constitutional: Alert, no distress, well-groomed.  Skin: No rashes in visible areas.  Eye: Round. Conjunctiva clear, lids normal. No icterus.   ENMT: Lips pink without lesions, good dentition, moist mucous membranes. Phonation normal.  Neck: No masses, no thyromegaly. Moves freely without pain.  CV: Pulse as reported by patient  Respiratory: Unlabored respiratory effort, no cough or audible wheeze  Psych: Alert and oriented x3, normal affect and mood.     Lab Data Review:  Lab Results   Component Value Date/Time    CHOLSTRLTOT 160 03/04/2021 07:18 AM     (H) 03/04/2021 07:18 AM    HDL 37 (A) 03/04/2021 07:18 AM    TRIGLYCERIDE 72 03/04/2021 07:18 AM       Lab Results   Component Value Date/Time    SODIUM 140 03/04/2021 07:18 AM    POTASSIUM 3.3 (L) 03/04/2021 07:18 AM    CHLORIDE 100 03/04/2021 07:18 AM    CO2 30 03/04/2021 07:18 AM    GLUCOSE 98 03/04/2021 07:18 AM    BUN 14 03/04/2021 07:18 AM    CREATININE 0.73 03/04/2021 07:18 AM     CrCl cannot be calculated (Patient's most recent lab result is older than the maximum 7 days allowed.).  Lab Results   Component Value Date/Time    ALKPHOSPHAT 84 03/04/2021 07:18 AM    ASTSGOT 19 03/04/2021 07:18 AM    ALTSGPT 21 03/04/2021 07:18 AM    TBILIRUBIN 0.4 03/04/2021 07:18 AM    "   Lab Results   Component Value Date/Time    WBC 6.2 01/23/2020 07:34 AM     No results found for: HBA1C  No components found for: TROP      Cardiac Imaging and Procedures Review:      EKG 2/27/20 normal sinus    TTE 4/9/2020  CONCLUSIONS  Normal echocardiogram.  No prior study is available for comparison.   Left Ventricle  Normal left ventricular chamber size. Normal left ventricular wall   thickness. Normal left ventricular systolic function. Left ventricular   ejection fraction is visually estimated to be 60%. Normal regional wall   motion. Normal diastolic function.    Medical Decision Making:  Problem List Items Addressed This Visit     Essential hypertension    Relevant Orders    Basic Metabolic Panel    REFERRAL TO NUTRITION SERVICES    10 year risk of MI or stroke < 7.5%    Relevant Orders    REFERRAL TO NUTRITION SERVICES        Low 10 year risk ascvd - continue lifestyle changes with nutrition referral, diet modification, and regular exercise 150min weekly. No need lipid lowering medication at this time.    Home BP sustained above 140/90 previously and was started on chlorthalidone. Continue regimen. Repeat potassium, if remains low can add supplement, however, would rather she supplement naturally.    If palpitations become bothersome can check zio monitor.    If should develop thrombus, only anticoagulant option is coumadin setting of lupus anticoagulant.    It was my pleasure to meet with Ms. Todd.

## 2021-04-12 ENCOUNTER — HOSPITAL ENCOUNTER (OUTPATIENT)
Dept: LAB | Facility: MEDICAL CENTER | Age: 51
End: 2021-04-12
Attending: INTERNAL MEDICINE
Payer: COMMERCIAL

## 2021-04-12 DIAGNOSIS — I10 ESSENTIAL HYPERTENSION: ICD-10-CM

## 2021-04-12 LAB
ANION GAP SERPL CALC-SCNC: 9 MMOL/L (ref 7–16)
BUN SERPL-MCNC: 14 MG/DL (ref 8–22)
CALCIUM SERPL-MCNC: 9.6 MG/DL (ref 8.5–10.5)
CHLORIDE SERPL-SCNC: 99 MMOL/L (ref 96–112)
CO2 SERPL-SCNC: 30 MMOL/L (ref 20–33)
CREAT SERPL-MCNC: 0.73 MG/DL (ref 0.5–1.4)
FASTING STATUS PATIENT QL REPORTED: NORMAL
GLUCOSE SERPL-MCNC: 100 MG/DL (ref 65–99)
POTASSIUM SERPL-SCNC: 3.2 MMOL/L (ref 3.6–5.5)
SODIUM SERPL-SCNC: 138 MMOL/L (ref 135–145)

## 2021-04-12 PROCEDURE — 80048 BASIC METABOLIC PNL TOTAL CA: CPT

## 2021-04-12 PROCEDURE — 36415 COLL VENOUS BLD VENIPUNCTURE: CPT

## 2021-05-20 ENCOUNTER — OFFICE VISIT (OUTPATIENT)
Dept: HEALTH INFORMATION MANAGEMENT | Facility: MEDICAL CENTER | Age: 51
End: 2021-05-20
Payer: COMMERCIAL

## 2021-05-20 VITALS — BODY MASS INDEX: 28.46 KG/M2 | WEIGHT: 181.3 LBS | HEIGHT: 67 IN

## 2021-05-20 DIAGNOSIS — I10 ESSENTIAL HYPERTENSION: ICD-10-CM

## 2021-05-20 DIAGNOSIS — Z91.89 10 YEAR RISK OF MI OR STROKE < 7.5%: ICD-10-CM

## 2021-05-20 PROCEDURE — 97802 MEDICAL NUTRITION INDIV IN: CPT | Performed by: DIETITIAN, REGISTERED

## 2021-05-20 ASSESSMENT — FIBROSIS 4 INDEX: FIB4 SCORE: 0.78

## 2021-05-21 NOTE — PROGRESS NOTES
"5/20/2021    Una Nevarez A.P.R.NFarheen  50 y.o.   Time in/out: 2:42-3:33 PM    Anthropometrics/Objective  Vitals:    05/20/21 1806   Weight: 82.2 kg (181 lb 4.8 oz)   Height: 1.702 m (5' 7\")       Body mass index is 28.4 kg/m².    Stated Goal Weight: 145-150 lb  Estimated Caloric needs 1475 Kcal ([place])  See comprehensive patient history form for further information     Subjective:  - pt is here today to learn more about nutrition to help reduce her cholesterol, increase potassium in her diet and has a goal of weight loss  - pt reports she has been gradually gaining weight  - interested in meal replacement shakes  - pt reports she has been less active recently d/t her toenail, possible broken toe  - has exercise equipment at home    Nutrition Diagnosis (PES Statement)  · Food/nutrition knowledge deficit related to no previous nutrition education as evidenced by limited nutrition knowledge per discussion with patient.    Client history:  Condition(s) associated with a diagnosis or treatment (specify) HTN, vitamin D deficiency    Biochemical data, medical test and procedures  No results found for: HBA1C@  No results found for: POCGLUCOSE  Lab Results   Component Value Date/Time    CHOLSTRLTOT 160 03/04/2021 07:18 AM     (H) 03/04/2021 07:18 AM    HDL 37 (A) 03/04/2021 07:18 AM    TRIGLYCERIDE 72 03/04/2021 07:18 AM         Nutrition Intervention    Meal and Snack  Recommend a general/healthful diet    Comprehensive Nutrition education Instruction or training leading to in-depth nutrition related knowledge about:  Combine carb, protein and fat at each meal   - My Plate   - Protein MR shakes and bars   - AHA Fats    Monitoring & Evaluation Plan    Behavioral-Environmental:  Physical activity: increase as tolerated    Food / Nutrient Intake:  Fluid/Beverage intake: drink at least 64 oz water daily  Food intake: follow the plate method for meal balance and portion control    Physical Signs / Symptoms:  Lipid " profiles WNL and Weight change -1-2 lbs per week    Assessment Notes:  Today we reviewed the plate method for meal balance and portion control. We discussed how to build protein into each meal and snack, incorporating 1/2 plate non-starchy vegetable twice daily, ¼ plate of complex carbohydrate at meals. Provided pt with the Protein MR shake and bar options handout. Pt is interested in using 1 MR bar at breakfast daily. We reviewed the AHA fats handout. Suggested to include more of the monounsaturated fats and polyunsaturated fats, limit the saturated fats and avoid the hydrogenated oils and trans fats.     Goals:  1. Exercise for 20 minutes, 5x/ week  2. Use 1 MR bar daily     F/U: PRN

## 2021-07-04 DIAGNOSIS — I10 ESSENTIAL HYPERTENSION: ICD-10-CM

## 2021-07-06 RX ORDER — CHLORTHALIDONE 25 MG/1
TABLET ORAL
Qty: 90 TABLET | Refills: 2 | Status: SHIPPED | OUTPATIENT
Start: 2021-07-06 | End: 2022-03-15

## 2022-03-15 DIAGNOSIS — I10 ESSENTIAL HYPERTENSION: ICD-10-CM

## 2022-03-18 RX ORDER — CHLORTHALIDONE 25 MG/1
TABLET ORAL
Qty: 90 TABLET | Refills: 0 | Status: SHIPPED | OUTPATIENT
Start: 2022-03-18 | End: 2022-04-28

## 2022-04-11 DIAGNOSIS — R00.2 PALPITATIONS: ICD-10-CM

## 2022-04-13 RX ORDER — POTASSIUM CHLORIDE 20 MEQ/1
20 TABLET, EXTENDED RELEASE ORAL DAILY
Qty: 60 TABLET | Refills: 0 | Status: SHIPPED | OUTPATIENT
Start: 2022-04-13 | End: 2022-04-28

## 2022-04-25 ENCOUNTER — HOSPITAL ENCOUNTER (OUTPATIENT)
Dept: LAB | Facility: MEDICAL CENTER | Age: 52
End: 2022-04-25
Attending: NURSE PRACTITIONER
Payer: COMMERCIAL

## 2022-04-25 DIAGNOSIS — E03.9 ACQUIRED HYPOTHYROIDISM: ICD-10-CM

## 2022-04-25 DIAGNOSIS — I10 ESSENTIAL HYPERTENSION: ICD-10-CM

## 2022-04-25 LAB
ALBUMIN SERPL BCP-MCNC: 4.5 G/DL (ref 3.2–4.9)
ALBUMIN/GLOB SERPL: 1.3 G/DL
ALP SERPL-CCNC: 80 U/L (ref 30–99)
ALT SERPL-CCNC: 15 U/L (ref 2–50)
ANION GAP SERPL CALC-SCNC: 13 MMOL/L (ref 7–16)
AST SERPL-CCNC: 20 U/L (ref 12–45)
BILIRUB SERPL-MCNC: 0.6 MG/DL (ref 0.1–1.5)
BUN SERPL-MCNC: 15 MG/DL (ref 8–22)
CALCIUM SERPL-MCNC: 9.9 MG/DL (ref 8.5–10.5)
CHLORIDE SERPL-SCNC: 100 MMOL/L (ref 96–112)
CHOLEST SERPL-MCNC: 179 MG/DL (ref 100–199)
CO2 SERPL-SCNC: 28 MMOL/L (ref 20–33)
CREAT SERPL-MCNC: 0.79 MG/DL (ref 0.5–1.4)
FASTING STATUS PATIENT QL REPORTED: NORMAL
GFR SERPLBLD CREATININE-BSD FMLA CKD-EPI: 90 ML/MIN/1.73 M 2
GLOBULIN SER CALC-MCNC: 3.4 G/DL (ref 1.9–3.5)
GLUCOSE SERPL-MCNC: 91 MG/DL (ref 65–99)
HDLC SERPL-MCNC: 36 MG/DL
LDLC SERPL CALC-MCNC: 129 MG/DL
POTASSIUM SERPL-SCNC: 3.2 MMOL/L (ref 3.6–5.5)
PROT SERPL-MCNC: 7.9 G/DL (ref 6–8.2)
SODIUM SERPL-SCNC: 141 MMOL/L (ref 135–145)
TRIGL SERPL-MCNC: 68 MG/DL (ref 0–149)
TSH SERPL DL<=0.005 MIU/L-ACNC: 0.52 UIU/ML (ref 0.38–5.33)

## 2022-04-25 PROCEDURE — 80061 LIPID PANEL: CPT

## 2022-04-25 PROCEDURE — 84443 ASSAY THYROID STIM HORMONE: CPT

## 2022-04-25 PROCEDURE — 80053 COMPREHEN METABOLIC PANEL: CPT

## 2022-04-25 PROCEDURE — 36415 COLL VENOUS BLD VENIPUNCTURE: CPT

## 2022-04-25 PROCEDURE — 85027 COMPLETE CBC AUTOMATED: CPT

## 2022-04-26 LAB
ERYTHROCYTE [DISTWIDTH] IN BLOOD BY AUTOMATED COUNT: 40.4 FL (ref 35.9–50)
HCT VFR BLD AUTO: 43.4 % (ref 37–47)
HGB BLD-MCNC: 14.6 G/DL (ref 12–16)
MCH RBC QN AUTO: 29.9 PG (ref 27–33)
MCHC RBC AUTO-ENTMCNC: 33.6 G/DL (ref 33.6–35)
MCV RBC AUTO: 88.8 FL (ref 81.4–97.8)
PLATELET # BLD AUTO: 280 K/UL (ref 164–446)
PMV BLD AUTO: 10.6 FL (ref 9–12.9)
RBC # BLD AUTO: 4.89 M/UL (ref 4.2–5.4)
WBC # BLD AUTO: 5.8 K/UL (ref 4.8–10.8)

## 2022-04-28 DIAGNOSIS — I10 ESSENTIAL HYPERTENSION: ICD-10-CM

## 2022-04-28 RX ORDER — SPIRONOLACTONE AND HYDROCHLOROTHIAZIDE 25; 25 MG/1; MG/1
1 TABLET ORAL DAILY
Qty: 30 TABLET | Refills: 3 | Status: SHIPPED | OUTPATIENT
Start: 2022-04-28 | End: 2022-08-09 | Stop reason: SDUPTHER

## 2022-05-16 ENCOUNTER — HOSPITAL ENCOUNTER (OUTPATIENT)
Dept: LAB | Facility: MEDICAL CENTER | Age: 52
End: 2022-05-16
Attending: INTERNAL MEDICINE
Payer: COMMERCIAL

## 2022-05-16 DIAGNOSIS — I10 ESSENTIAL HYPERTENSION: ICD-10-CM

## 2022-05-16 LAB
ANION GAP SERPL CALC-SCNC: 10 MMOL/L (ref 7–16)
BUN SERPL-MCNC: 19 MG/DL (ref 8–22)
CALCIUM SERPL-MCNC: 9.7 MG/DL (ref 8.5–10.5)
CHLORIDE SERPL-SCNC: 102 MMOL/L (ref 96–112)
CO2 SERPL-SCNC: 28 MMOL/L (ref 20–33)
CREAT SERPL-MCNC: 0.77 MG/DL (ref 0.5–1.4)
FASTING STATUS PATIENT QL REPORTED: NORMAL
GFR SERPLBLD CREATININE-BSD FMLA CKD-EPI: 93 ML/MIN/1.73 M 2
GLUCOSE SERPL-MCNC: 87 MG/DL (ref 65–99)
POTASSIUM SERPL-SCNC: 3.9 MMOL/L (ref 3.6–5.5)
SODIUM SERPL-SCNC: 140 MMOL/L (ref 135–145)

## 2022-05-16 PROCEDURE — 80048 BASIC METABOLIC PNL TOTAL CA: CPT

## 2022-05-16 PROCEDURE — 36415 COLL VENOUS BLD VENIPUNCTURE: CPT

## 2022-05-17 ENCOUNTER — TELEPHONE (OUTPATIENT)
Dept: CARDIOLOGY | Facility: MEDICAL CENTER | Age: 52
End: 2022-05-17
Payer: COMMERCIAL

## 2022-05-17 NOTE — LETTER
May 17, 2022        Penny Todd  [address] [address]          Dear Penny,    We have received the results of your recent:    Lab Results     Your test came back within normal limits.  Please follow up as previously discussed with your physician.      Feel free to call us with any questions.        Sincerely,        Barry Desai Ass't of Dr. Guerrero  Electronically Signed

## 2022-05-17 NOTE — TELEPHONE ENCOUNTER
VR    Caller: PT  Topic/issue: Looking for the results of their labs they had recently.   Callback Number: [phone].

## 2022-05-17 NOTE — TELEPHONE ENCOUNTER
----- Message from Lucita Calzada R.N. sent at 5/17/2022  9:06 AM PDT -----  Please notify pt of WNL lab result

## 2022-05-25 DIAGNOSIS — E03.9 ACQUIRED HYPOTHYROIDISM: ICD-10-CM

## 2022-05-25 RX ORDER — LEVOTHYROXINE SODIUM 88 UG/1
TABLET ORAL
Qty: 30 TABLET | Refills: 0 | Status: SHIPPED | OUTPATIENT
Start: 2022-05-25 | End: 2022-06-09 | Stop reason: SDUPTHER

## 2022-05-25 NOTE — TELEPHONE ENCOUNTER
Received request via: Patient    Was the patient seen in the last year in this department? No    Pt did not feel necessary to make a yearly appointment.     Does the patient have an active prescription (recently filled or refills available) for medication(s) requested? No

## 2022-06-09 ENCOUNTER — OFFICE VISIT (OUTPATIENT)
Dept: MEDICAL GROUP | Facility: PHYSICIAN GROUP | Age: 52
End: 2022-06-09
Payer: COMMERCIAL

## 2022-06-09 VITALS
SYSTOLIC BLOOD PRESSURE: 112 MMHG | WEIGHT: 163.6 LBS | OXYGEN SATURATION: 96 % | RESPIRATION RATE: 20 BRPM | HEIGHT: 66 IN | BODY MASS INDEX: 26.29 KG/M2 | TEMPERATURE: 98 F | HEART RATE: 89 BPM | DIASTOLIC BLOOD PRESSURE: 70 MMHG

## 2022-06-09 DIAGNOSIS — N95.1 PERIMENOPAUSE: ICD-10-CM

## 2022-06-09 DIAGNOSIS — F41.1 GENERALIZED ANXIETY DISORDER: ICD-10-CM

## 2022-06-09 DIAGNOSIS — J45.20 MILD INTERMITTENT ASTHMA WITHOUT COMPLICATION: ICD-10-CM

## 2022-06-09 DIAGNOSIS — E03.9 ACQUIRED HYPOTHYROIDISM: ICD-10-CM

## 2022-06-09 DIAGNOSIS — Z00.00 ENCOUNTER FOR PREVENTATIVE ADULT HEALTH CARE EXAMINATION: ICD-10-CM

## 2022-06-09 PROBLEM — M67.431 GANGLION, RIGHT WRIST: Status: RESOLVED | Noted: 2018-05-30 | Resolved: 2022-06-09

## 2022-06-09 PROCEDURE — 99214 OFFICE O/P EST MOD 30 MIN: CPT | Performed by: NURSE PRACTITIONER

## 2022-06-09 RX ORDER — LEVOTHYROXINE SODIUM 88 UG/1
TABLET ORAL
Qty: 90 TABLET | Refills: 3 | Status: SHIPPED | OUTPATIENT
Start: 2022-06-09

## 2022-06-09 RX ORDER — CITALOPRAM HYDROBROMIDE 10 MG/1
TABLET ORAL
Qty: 90 TABLET | Refills: 3 | Status: SHIPPED | OUTPATIENT
Start: 2022-06-09 | End: 2023-04-25

## 2022-06-09 RX ORDER — ALBUTEROL SULFATE 90 UG/1
2 AEROSOL, METERED RESPIRATORY (INHALATION) EVERY 6 HOURS PRN
Qty: 8.5 G | Refills: 2 | Status: SHIPPED | OUTPATIENT
Start: 2022-06-09

## 2022-06-09 ASSESSMENT — PATIENT HEALTH QUESTIONNAIRE - PHQ9: CLINICAL INTERPRETATION OF PHQ2 SCORE: 0

## 2022-06-09 ASSESSMENT — FIBROSIS 4 INDEX: FIB4 SCORE: .940581669793229815

## 2022-06-09 NOTE — ASSESSMENT & PLAN NOTE
This is a chronic health problem that is well controlled with current medications and lifestyle measures.  Taking citalopram.  Will miss a day every now and then.  Thinking about wanting to discontinue medication, but advised by her friends that now might not be the best time since she is living with a teenager.  Denies SI/HI.

## 2022-06-09 NOTE — ASSESSMENT & PLAN NOTE
Chronic intermittent problem.  Needs to use albuterol inhaler maybe once or twice a year.  Her current inhaler is .  Requesting refill.

## 2022-06-09 NOTE — ASSESSMENT & PLAN NOTE
This is a chronic health problem that is well controlled with current medications and lifestyle measures.  Taking levothyroxine 88 mcg daily.  Denies skin or hair changes, constipation, fatigue.  TSH normal range.

## 2022-06-09 NOTE — PROGRESS NOTES
CC: Lab review, medication refills    HISTORY OF THE PRESENT ILLNESS: Patient is a 51 y.o. female. This pleasant patient is here today for evaluation and management of the following health problems.    Health Maintenance: Due  Patient declines pneumonia vaccine and tetanus vaccine, the rationale reviewed.  Reports had Cologuard testing done in 2021 that was ordered by her gynecologist.  Results were negative.  Also had Pap in 2021.  We will request records from [facility] in [place].      Acquired hypothyroidism  This is a chronic health problem that is well controlled with current medications and lifestyle measures.  Taking levothyroxine 88 mcg daily.  Denies skin or hair changes, constipation, fatigue.  TSH normal range.    Generalized anxiety disorder  This is a chronic health problem that is well controlled with current medications and lifestyle measures.  Taking citalopram.  Will miss a day every now and then.  Thinking about wanting to discontinue medication, but advised by her friends that now might not be the best time since she is living with a teenager.  Denies SI/HI.    Mild intermittent asthma without complication  Chronic intermittent problem.  Needs to use albuterol inhaler maybe once or twice a year.  Her current inhaler is .  Requesting refill.    Perimenopause  Established with gynecology at [facility].  Had last Pap on 2021.  Taking Jencycla daily.  Denies any concerns.      Allergies: Bactrim ds, Other misc, Sulfa drugs, Carrot [daucus carota], Food, Other environmental, Pcn [penicillins], Soap, and Terbinafine    Current Outpatient Medications Ordered in Epic   Medication Sig Dispense Refill   • levothyroxine (EUTHYROX) 88 MCG Tab TAKE 1 TABLET BY MOUTH ONCE DAILY IN THE MORNING ON AN EMPTY STOMACH 90 Tablet 3   • citalopram (CELEXA) 10 MG tablet TAKE 1 TABLET BY MOUTH ONCE DAILY IN THE EVENING 90 Tablet 3   • albuterol 108 (90 Base) MCG/ACT Aero Soln inhalation aerosol Inhale 2  Puffs every 6 hours as needed for Shortness of Breath. 8.5 g 2   • spironolactone/hctz (ALDACTAZIDE) 25-25 MG Tab Take 1 Tablet by mouth every day. 30 Tablet 3   • NON SPECIFIED Indications: circulation and vein support supplement     • Multiple Vitamins-Minerals (MULTIVITAMIN PO) Take 1 Tab by mouth every day.     • cetirizine (ZYRTEC) 10 MG Tab Take 10 mg by mouth as needed for Allergies.     • JENCYCLA 0.35 MG tablet Take 0.35 mg by mouth every evening.       No current Epic-ordered facility-administered medications on file.       Past Medical History:   Diagnosis Date   • Acid reflux    • Allergy    • Anxiety    • Arrhythmia     Sounds like PVC   • Asthma     Inhaler Use PRN   • Blood clotting disorder (HCC) 09/06/2019    Pt. states was DX with Lupus Anticoagulant During Pregnancy.   • Clotting disorder (HCC)    • Hypertension    • Lupus anticoagulant disorder (HCC) 09/06/2019    Pt. states H/O with pregnancies.   • Miscarriage     x3   Pt. states was DX with Lupus Anticoagulant.   • Palpitations     H/O   • Thyroid disease     was hyperthyroid and had radiation to thyroid       Past Surgical History:   Procedure Laterality Date   • PB INCIS TENDON SHEATH,RADIAL STYLOID Right 9/18/2019    Procedure: RELEASE, HAND, FOR DEQUERVAIN'S TENOSYNOVITIS;  Surgeon: Tl Greene M.D.;  Location: SURGERY [facility];  Service: Orthopedics   • GANGLION EXCISION Right 9/18/2019    Procedure: EXCISION, GANGLION CYST- WRIST;  Surgeon: Tl Greene M.D.;  Location: SURGERY [facility];  Service: Orthopedics   • DENTAL EXTRACTION(S)      wisdom teeth   • GYN SURGERY      d & C       Social History     Tobacco Use   • Smoking status: Never Smoker   • Smokeless tobacco: Never Used   Vaping Use   • Vaping Use: Never used   Substance Use Topics   • Alcohol use: No   • Drug use: No       Family History   Problem Relation Age of Onset   • Heart Disease Father    • Alcohol/Drug Father    • Hyperlipidemia Father  "   • Hypertension Father        ROS:  As in HPI, otherwise negative for chest pain, dyspnea, abdominal pain, dysuria, blood in stool, fever          Exam: /70 (BP Location: Right arm, Patient Position: Sitting, BP Cuff Size: Adult)   Pulse 89   Temp 36.7 °C (98 °F) (Temporal)   Resp 20   Ht 1.676 m (5' 6\")   Wt 74.2 kg (163 lb 9.6 oz)   SpO2 96%  Body mass index is 26.41 kg/m².    General: Alert, pleasant, well nourished, well developed female in NAD  HEENT: Normocephalic. Eyes conjunctiva clear lids without ptosis, pupils equal and reactive to light, ears normal shape and contour, canals are clear bilaterally, tympanic membranes are pearly gray with good light reflex, nasal mucosa without erythema and drainage, oropharynx is without erythema, edema or exudates.   Neck: Supple without bruit. Thyroid is not enlarged.  Pulmonary: Clear to ausculation.  Normal effort. No rales, ronchi, or wheezing.  Cardiovascular: Normal rate and rhythm without murmur. Carotid and radial pulses are intact and equal bilaterally.  No lower extremity edema.  Abdomen: Soft, nontender, nondistended. Normal bowel sounds. Liver and spleen are not palpable  Neurologic: Grossly nonfocal  Lymph: No cervical or supraclavicular lymph nodes are palpable  Skin: Warm and dry.   Musculoskeletal: Normal gait.   Psych: Normal mood and affect. Alert and oriented. Judgment and insight is normal.    Please note that this dictation was created using voice recognition software. I have made every reasonable attempt to correct obvious errors, but I expect that there are errors of grammar and possibly content that I did not discover before finalizing the note.      Assessment/Plan  1. Acquired hypothyroidism  Clinically euthyroid.  TSH in therapeutic range.  Continue with current dose, no changes.  - levothyroxine (EUTHYROX) 88 MCG Tab; TAKE 1 TABLET BY MOUTH ONCE DAILY IN THE MORNING ON AN EMPTY STOMACH  Dispense: 90 Tablet; Refill: 3    2. " Generalized anxiety disorder  Well-controlled.  Continue with Celexa.  Did advise patient if she would like to trial tapering down, she could take a half a tablet daily to see if she feels good with decreased dose.  Patient going to [facility] with daughter and  soon.  - citalopram (CELEXA) 10 MG tablet; TAKE 1 TABLET BY MOUTH ONCE DAILY IN THE EVENING  Dispense: 90 Tablet; Refill: 3    3. Mild intermittent asthma without complication  Well-controlled.  Continue with albuterol as needed.  - albuterol 108 (90 Base) MCG/ACT Aero Soln inhalation aerosol; Inhale 2 Puffs every 6 hours as needed for Shortness of Breath.  Dispense: 8.5 g; Refill: 2    5. Perimenopause  Well-controlled.  Continue follow-up with gynecology.    6. Encounter for preventative adult health care examination  Chronic health problems stable.  Advised on routine aerobic exercise, vaccinations.  We will get updated records on colon and breast cancer screening.    Patient will return to clinic annually or sooner if needed.  She will call to schedule appointment

## 2022-06-09 NOTE — LETTER
[facility]  SERENA Dumont  [address]  [place] [address]  Fax: [phone]   Authorization for Release/Disclosure of   Protected Health Information   Name: PENNY MCCALL : 1970 SSN: [id number]   Address: [address] [place], [place] [address] [place] [address] Phone:    [phone] (home) [phone] (work)   I authorize the entity listed below to release/disclose the PHI below to:   [facility]/SERENA Dumont and SERENA Dumont   Provider or Entity Name:     Address   City, State, Zip   Phone:      Fax:     Reason for request: continuity of care   Information to be released:    [  ] LAST COLONOSCOPY,  including any PATH REPORT and follow-up  [  ] LAST FIT/COLOGUARD RESULT [  ] LAST DEXA  [  ] LAST MAMMOGRAM  [  ] LAST PAP  [  ] LAST LABS [  ] RETINA EXAM REPORT  [  ] IMMUNIZATION RECORDS  [  ] Release all info      [  ] Check here and initial the line next to each item to release ALL health information INCLUDING  _____ Care and treatment for drug and / or alcohol abuse  _____ HIV testing, infection status, or AIDS  _____ Genetic Testing    DATES OF SERVICE OR TIME PERIOD TO BE DISCLOSED: _____________  I understand and acknowledge that:  * This Authorization may be revoked at any time by you in writing, except if your health information has already been used or disclosed.  * Your health information that will be used or disclosed as a result of you signing this authorization could be re-disclosed by the recipient. If this occurs, your re-disclosed health information may no longer be protected by State or Federal laws.  * You may refuse to sign this Authorization. Your refusal will not affect your ability to obtain treatment.  * This Authorization becomes effective upon signing and will  on (date) __________.      If no date is indicated, this Authorization will  one (1) year from the signature date.    Name: Penny Churchill  Perez    Signature:   Date:     6/9/2022       PLEASE FAX REQUESTED RECORDS BACK TO: [phone]

## 2022-06-10 NOTE — ASSESSMENT & PLAN NOTE
Established with gynecology at [facility].  Had last Pap on 11/2021.  Taking Jencycla daily.  Denies any concerns.

## 2022-06-14 ENCOUNTER — OFFICE VISIT (OUTPATIENT)
Dept: URGENT CARE | Facility: PHYSICIAN GROUP | Age: 52
End: 2022-06-14
Payer: COMMERCIAL

## 2022-06-14 VITALS
HEIGHT: 66 IN | WEIGHT: 163 LBS | SYSTOLIC BLOOD PRESSURE: 120 MMHG | TEMPERATURE: 99.6 F | RESPIRATION RATE: 16 BRPM | OXYGEN SATURATION: 97 % | DIASTOLIC BLOOD PRESSURE: 84 MMHG | BODY MASS INDEX: 26.2 KG/M2 | HEART RATE: 122 BPM

## 2022-06-14 DIAGNOSIS — B97.89 VIRAL RESPIRATORY ILLNESS: ICD-10-CM

## 2022-06-14 DIAGNOSIS — J98.8 VIRAL RESPIRATORY ILLNESS: ICD-10-CM

## 2022-06-14 DIAGNOSIS — U07.1 COVID-19: ICD-10-CM

## 2022-06-14 DIAGNOSIS — R50.9 FEVER, UNSPECIFIED FEVER CAUSE: ICD-10-CM

## 2022-06-14 LAB
EXTERNAL QUALITY CONTROL: ABNORMAL
FLUAV+FLUBV AG SPEC QL IA: NEGATIVE
INT CON NEG: NORMAL
INT CON POS: NORMAL
SARS-COV+SARS-COV-2 AG RESP QL IA.RAPID: POSITIVE

## 2022-06-14 PROCEDURE — 99214 OFFICE O/P EST MOD 30 MIN: CPT | Mod: CS | Performed by: PHYSICIAN ASSISTANT

## 2022-06-14 PROCEDURE — 87804 INFLUENZA ASSAY W/OPTIC: CPT | Performed by: PHYSICIAN ASSISTANT

## 2022-06-14 PROCEDURE — 87426 SARSCOV CORONAVIRUS AG IA: CPT | Performed by: PHYSICIAN ASSISTANT

## 2022-06-14 ASSESSMENT — ENCOUNTER SYMPTOMS
VOMITING: 0
NAUSEA: 0
FEVER: 1
SHORTNESS OF BREATH: 0
COUGH: 1
DIARRHEA: 0
CHILLS: 1
HEADACHES: 1
EYE REDNESS: 0
SORE THROAT: 1
EYE DISCHARGE: 0

## 2022-06-14 ASSESSMENT — FIBROSIS 4 INDEX: FIB4 SCORE: .940581669793229815

## 2022-06-14 NOTE — PROGRESS NOTES
Subjective     Penny Todd is a 51 y.o. female who presents with Fever (X1day ) and Bug Bite (X3days )          URI   This is a new problem. Episode onset: x 1 day ago. The problem has been unchanged. The maximum temperature recorded prior to her arrival was 102 - 102.9 F (The patient reports an associated fever 102.4). Associated symptoms include congestion, coughing, ear pain (The patient reports associated right ear pain.), headaches and a sore throat (The patient reports an associated irritation to her throat at the onset of symptoms.  The patient states this irritation was triggering a dry cough.). Pertinent negatives include no chest pain, diarrhea, nausea or vomiting. She has tried acetaminophen for the symptoms.     The patient states several co-workers are sick with similar symptoms.  The patient reports no known exposure to COVID-19 and/or influenza.  The patient has not been vaccinated against COVID-19.    The patient states she also sustained a bug bite to the inside of her right elbow x 4 days ago. The patient states the bite was itchy at onset of symptoms, but states this has since improved.  The patient states the bug bite has a localized area of redness, but states this has not increased in size over the past several days.  The patient reports no associated pain/tenderness, swelling, or discharge/drainage.  The patient has applied Neosporin to the affected area.    PMH:  has a past medical history of Acid reflux, Allergy, Anxiety, Arrhythmia, Asthma, Blood clotting disorder (HCC) (09/06/2019), Clotting disorder (HCC), Hypertension, Lupus anticoagulant disorder (HCC) (09/06/2019), Miscarriage, Palpitations, and Thyroid disease.  MEDS:   Current Outpatient Medications:   •  levothyroxine (EUTHYROX) 88 MCG Tab, TAKE 1 TABLET BY MOUTH ONCE DAILY IN THE MORNING ON AN EMPTY STOMACH, Disp: 90 Tablet, Rfl: 3  •  citalopram (CELEXA) 10 MG tablet, TAKE 1 TABLET BY MOUTH ONCE DAILY IN THE EVENING,  "Disp: 90 Tablet, Rfl: 3  •  albuterol 108 (90 Base) MCG/ACT Aero Soln inhalation aerosol, Inhale 2 Puffs every 6 hours as needed for Shortness of Breath., Disp: 8.5 g, Rfl: 2  •  spironolactone/hctz (ALDACTAZIDE) 25-25 MG Tab, Take 1 Tablet by mouth every day., Disp: 30 Tablet, Rfl: 3  •  NON SPECIFIED, Indications: circulation and vein support supplement, Disp: , Rfl:   •  Multiple Vitamins-Minerals (MULTIVITAMIN PO), Take 1 Tab by mouth every day., Disp: , Rfl:   •  cetirizine (ZYRTEC) 10 MG Tab, Take 10 mg by mouth as needed for Allergies., Disp: , Rfl:   •  JENCYCLA 0.35 MG tablet, Take 0.35 mg by mouth every evening., Disp: , Rfl:   ALLERGIES:   Allergies   Allergen Reactions   • Bactrim Ds Anaphylaxis   • Other Misc Anaphylaxis     CILANTRO   • Sulfa Drugs Anaphylaxis   • Carrot [Daucus Carota]      Pt reports she gets a bubble in her throat can't breath      • Food Shortness of Breath and Itching     Cilantro, pecans, walnuts, carrots, bananas, cantaloupe   • Other Environmental Runny Nose and Itching     \"Cat Hair, all Trees, Cows & sagebrush.\"    • Pcn [Penicillins] Rash     Rash   • Soap Rash     Dial, get a rash   • Terbinafine      SURGHX:   Past Surgical History:   Procedure Laterality Date   • PB INCIS TENDON SHEATH,RADIAL STYLOID Right 9/18/2019    Procedure: RELEASE, HAND, FOR DEQUERVAIN'S TENOSYNOVITIS;  Surgeon: Tl Greene M.D.;  Location: SURGERY [facility];  Service: Orthopedics   • GANGLION EXCISION Right 9/18/2019    Procedure: EXCISION, GANGLION CYST- WRIST;  Surgeon: Tl Greene M.D.;  Location: SURGERY [facility];  Service: Orthopedics   • DENTAL EXTRACTION(S)      wisdom teeth   • GYN SURGERY      d & C     SOCHX:  reports that she has never smoked. She has never used smokeless tobacco. She reports that she does not drink alcohol and does not use drugs.  FH: Family history was reviewed, no pertinent findings to report      Review of Systems   Constitutional: " "Positive for chills and fever.   HENT: Positive for congestion, ear pain (The patient reports associated right ear pain.) and sore throat (The patient reports an associated irritation to her throat at the onset of symptoms.  The patient states this irritation was triggering a dry cough.).    Eyes: Negative for discharge and redness.   Respiratory: Positive for cough. Negative for shortness of breath.    Cardiovascular: Negative for chest pain.   Gastrointestinal: Negative for diarrhea, nausea and vomiting.   Neurological: Positive for headaches.              Objective     /84   Pulse (!) 122   Temp 37.6 °C (99.6 °F) (Temporal)   Resp 16   Ht 1.676 m (5' 6\")   Wt 73.9 kg (163 lb)   LMP  (LMP Unknown)   SpO2 97%   BMI 26.31 kg/m²      Physical Exam  Constitutional:       General: She is not in acute distress.     Appearance: Normal appearance. She is not ill-appearing.   HENT:      Head: Normocephalic and atraumatic.      Right Ear: Tympanic membrane, ear canal and external ear normal.      Left Ear: Tympanic membrane, ear canal and external ear normal.      Nose: Nose normal.      Mouth/Throat:      Mouth: Mucous membranes are moist.      Pharynx: Oropharynx is clear. No posterior oropharyngeal erythema.   Eyes:      Extraocular Movements: Extraocular movements intact.      Conjunctiva/sclera: Conjunctivae normal.   Cardiovascular:      Rate and Rhythm: Regular rhythm. Tachycardia present.      Heart sounds: Normal heart sounds.   Pulmonary:      Effort: Pulmonary effort is normal. No respiratory distress.      Breath sounds: Normal breath sounds. No wheezing.   Musculoskeletal:         General: Normal range of motion.      Cervical back: Normal range of motion and neck supple.   Skin:     General: Skin is warm and dry.      Comments:   Right Elbow:  The patient has a localized area of erythema to the antecubital fossa of the right elbow with a central, raised papule.  No tenderness to palpation.  No " edema.  No increased warmth.  No discharge/weeping.  No open wounds/lesions.  No streaking.  No secondary signs of infection.   Neurological:      Mental Status: She is alert and oriented to person, place, and time.               Progress:  POCT Rapid COVID-19: POSITIVE     POCT Influenza: NEGATIVE              Assessment & Plan          1. Viral respiratory illness    2. Fever, unspecified fever cause  - POCT Influenza A/B  - POCT SARS-COV Antigen EDITH (Symptomatic only)    3. COVID-19  - Nirmatrelvir & Ritonavir 20 x 150 MG & 10 x 100MG Tablet Therapy Pack; Take 300 mg nirmatrelvir (two 150 mg tablets) with 100 mg ritonavir (one 100 mg tablet) by mouth, with all three tablets taken together twice daily for 5 days.  Dispense: 30 Each; Refill: 0      The patient's presenting symptoms and physical exam endings are consistent with a viral respiratory illness with associated fever.  The patient's physical exam today in clinic was normal with the exception of an elevated heart rate and a bug bite to the right elbow -which is described above.  The patient is nontoxic and appears in no acute distress.  The patient's vital signs are stable and within normal limits, with the exception of her elevated heart rate as previously mentioned.  She is afebrile today in clinic.  Discussed likely viral etiology with the patient.  The patient's POCT influenza testing today in clinic was negative.  However, the patient's POCT rapid COVID-19 testing was positive.  This is consistent with the patient's current symptoms.  Advised patient to stay at home under self quarantine per CDC guidelines.  Provided the patient with self quarantine instructions.  Discussed treatment options for COVID-19 with Paxlovid.  Informed the patient of the associated risks and benefits of taking this medication.  The patient would like the option of taking Paxlovid for her current COVID-19 infection.  Informed the patient that she would not need to start this  medication within 5 days of the onset of symptoms.  Reviewed the patient's current medications on the [place] COVID-19 Interaction  to check for possible interactions.  The potential interaction was found with the patient's current oral contraceptive pill, Jencycla.  Informed the patient that Paxlovid may decrease the effectiveness of her oral contraceptive. Advised the patient to use an alternative form of birth control if she is currently taking Jencycla to prevent pregnancy.  Advised patient to monitor for worsening signs or symptoms.  Recommend OTC medications and supportive care for symptomatic management.  Recommend patient follow-up with her PCP as needed.  Discussed return precautions with the patient, and she verbalized understanding.    Differential diagnoses, supportive care, and indications for immediate follow-up discussed with patient.   Instructed to return to clinic or nearest emergency department for any change in condition, further concerns, or worsening of symptoms.    OTC Tylenol or Motrin for fever/discomfort.  OTC cough/cold medication for symptomatic relief  OTC Supportive Care for Congestion - saline nasal spray or neti pot  Drink plenty of fluids  Advised the patient to stay at home under self-isolation until symptoms have been present for at least 5 days and are improved, and there has been no fever for at least 72 hours without the use of medications and/or no vomiting or diarrhea for 48 hours.  --Provided the patient with home isolation and self quarantine instructions  Follow-up with PCP  Return to clinic or go to the ED if symptoms worsen or fail to improve, or if the patient should develop worsening/increasing cough, congestion, ear pain, sore throat, shortness of breath, wheezing, chest pain, fever/chills, and/or any concerning symptoms.    Discussed plan with the patient, and she agrees to the above.     I personally reviewed prior external notes and test results pertinent  to today's visit.  I have independently reviewed and interpreted all diagnostics ordered during this urgent care visit.       Please note that this dictation was created using voice recognition software. I have made every reasonable attempt to correct obvious errors, but I expect that there may be errors of grammar and possibly content that I did not discover before finalizing the note.     This note was electronically signed by Jaelyn Tapia PA-C

## 2022-07-20 ENCOUNTER — TELEPHONE (OUTPATIENT)
Dept: MEDICAL GROUP | Facility: PHYSICIAN GROUP | Age: 52
End: 2022-07-20
Payer: COMMERCIAL

## 2022-07-20 NOTE — TELEPHONE ENCOUNTER
----- Message from Mary Jane Victor sent at 7/6/2022 10:23 AM PDT -----  Regarding: Change Incorrect  Insurance calling to dispute the charges used for DOS 06/09/22.  Please call representative at [phone]  Reference number [id number]  I spoke with Jeannine ESPITIA

## 2022-07-20 NOTE — TELEPHONE ENCOUNTER
Spoke with  Chioma woody / ref  # [id number]          We saw the pt for other item than annual wellness .   They will reach out to the member.

## 2022-08-09 DIAGNOSIS — I10 ESSENTIAL HYPERTENSION: ICD-10-CM

## 2022-08-11 ENCOUNTER — TELEPHONE (OUTPATIENT)
Dept: URGENT CARE | Facility: PHYSICIAN GROUP | Age: 52
End: 2022-08-11
Payer: COMMERCIAL

## 2022-08-11 RX ORDER — SPIRONOLACTONE AND HYDROCHLOROTHIAZIDE 25; 25 MG/1; MG/1
1 TABLET ORAL DAILY
Qty: 90 TABLET | Refills: 0 | Status: SHIPPED | OUTPATIENT
Start: 2022-08-11 | End: 2022-10-21 | Stop reason: SDUPTHER

## 2022-08-11 NOTE — TELEPHONE ENCOUNTER
Is the patient due for a refill? Yes    Was the patient seen the past year? No    Date of last office visit: 03/24/21    Does the patient have an upcoming appointment?  Yes   If yes, When? 10/13/2022    Provider to refill: VR     Does the patients insurance require a 100 day supply?  No

## 2022-08-22 NOTE — TELEPHONE ENCOUNTER
I have reviewed with Coding department.  Unfortunately, I did not meet all the criteria in the appointment and documentation to bill as a preventative visit.

## 2022-10-13 ENCOUNTER — TELEMEDICINE (OUTPATIENT)
Dept: CARDIOLOGY | Facility: MEDICAL CENTER | Age: 52
End: 2022-10-13
Payer: COMMERCIAL

## 2022-10-13 VITALS
SYSTOLIC BLOOD PRESSURE: 119 MMHG | BODY MASS INDEX: 26.31 KG/M2 | HEART RATE: 88 BPM | DIASTOLIC BLOOD PRESSURE: 87 MMHG | HEIGHT: 66 IN

## 2022-10-13 DIAGNOSIS — I10 ESSENTIAL HYPERTENSION: ICD-10-CM

## 2022-10-13 DIAGNOSIS — Z91.89 10 YEAR RISK OF MI OR STROKE < 7.5%: ICD-10-CM

## 2022-10-13 PROCEDURE — 99214 OFFICE O/P EST MOD 30 MIN: CPT | Performed by: INTERNAL MEDICINE

## 2022-10-13 ASSESSMENT — ENCOUNTER SYMPTOMS
VOMITING: 0
ALTERED MENTAL STATUS: 0
HEARTBURN: 0
ABDOMINAL PAIN: 0
ORTHOPNEA: 0
WEIGHT LOSS: 0
CONSTIPATION: 0
DIARRHEA: 0
PALPITATIONS: 0
PND: 0
DEPRESSION: 0
FLANK PAIN: 0
DYSPNEA ON EXERTION: 0
COUGH: 0
IRREGULAR HEARTBEAT: 0
CLAUDICATION: 0
WEIGHT GAIN: 0
DECREASED APPETITE: 0
SYNCOPE: 0
FEVER: 0
BLURRED VISION: 0
DIZZINESS: 0
NEAR-SYNCOPE: 0
SHORTNESS OF BREATH: 0
NAUSEA: 0
BACK PAIN: 0

## 2022-10-13 NOTE — PROGRESS NOTES
This evaluation was conducted via Zoom using secure and encrypted videoconferencing technology. The patient was in a private location in the state of [place].     The patient's identity was confirmed and verbal consent was obtained for this virtual visit.     Cardiology Note    Chief Complaint   Patient presents with    Hypertension    Palpitations       History of Present Illness: Penny Todd is a 52 y.o. female PMH hypothyroidism, HTN, lupus anticoagulant without thrombus who presents for follow up visit.    No active cardiac complaints. Doing well this visit. Compliant with antihypertensives. Recently came off ssri and feels more stressed at work and blood pressure will climb. Improves when less stressed. She is considering resuming celexa with her PMD. No other cardiac complaints.    Previously noted: Previously tried amlodipine.  Then metop. Was not clear on BP target. 1/2019 her 10 year risk of ascvd was <2%. When was pregnant, lost fetus and she was discovered to have lupus anticoagulant. Never had thrombosis issues outside of pregnancy. No toxic social habits.    Review of Systems   Constitutional: Negative for decreased appetite, fever, malaise/fatigue, weight gain and weight loss.   HENT:  Negative for congestion and nosebleeds.    Eyes:  Negative for blurred vision.   Cardiovascular:  Negative for chest pain, claudication, dyspnea on exertion, irregular heartbeat, leg swelling, near-syncope, orthopnea, palpitations, paroxysmal nocturnal dyspnea and syncope.   Respiratory:  Negative for cough and shortness of breath.    Endocrine: Negative for cold intolerance and heat intolerance.   Skin:  Negative for rash.   Musculoskeletal:  Negative for back pain.   Gastrointestinal:  Negative for abdominal pain, constipation, diarrhea, heartburn, melena, nausea and vomiting.   Genitourinary:  Negative for dysuria, flank pain and hematuria.   Neurological:  Negative for dizziness.   Psychiatric/Behavioral:   Negative for altered mental status and depression.        Past Medical History:   Diagnosis Date    Acid reflux     Allergy     Anxiety     Arrhythmia     Sounds like PVC    Asthma     Inhaler Use PRN    Blood clotting disorder (HCC) 09/06/2019    Pt. states was DX with Lupus Anticoagulant During Pregnancy.    Clotting disorder (HCC)     Hypertension     Lupus anticoagulant disorder (HCC) 09/06/2019    Pt. states H/O with pregnancies.    Miscarriage     x3   Pt. states was DX with Lupus Anticoagulant.    Palpitations     H/O    Thyroid disease     was hyperthyroid and had radiation to thyroid         Past Surgical History:   Procedure Laterality Date    PB INCIS TENDON SHEATH,RADIAL STYLOID Right 9/18/2019    Procedure: RELEASE, HAND, FOR DEQUERVAIN'S TENOSYNOVITIS;  Surgeon: Tl Greene M.D.;  Location: SURGERY [facility];  Service: Orthopedics    GANGLION EXCISION Right 9/18/2019    Procedure: EXCISION, GANGLION CYST- WRIST;  Surgeon: Tl Greene M.D.;  Location: SURGERY [facility];  Service: Orthopedics    DENTAL EXTRACTION(S)      wisdom teeth    GYN SURGERY      d & C         Current Outpatient Medications   Medication Sig Dispense Refill    spironolactone/hctz (ALDACTAZIDE) 25-25 MG Tab Take 1 Tablet by mouth every day. 90 Tablet 0    levothyroxine (EUTHYROX) 88 MCG Tab TAKE 1 TABLET BY MOUTH ONCE DAILY IN THE MORNING ON AN EMPTY STOMACH 90 Tablet 3    albuterol 108 (90 Base) MCG/ACT Aero Soln inhalation aerosol Inhale 2 Puffs every 6 hours as needed for Shortness of Breath. 8.5 g 2    NON SPECIFIED Indications: circulation and vein support supplement      Multiple Vitamins-Minerals (MULTIVITAMIN PO) Take 1 Tab by mouth every day.      cetirizine (ZYRTEC) 10 MG Tab Take 10 mg by mouth as needed for Allergies.      JENCYCLA 0.35 MG tablet Take 0.35 mg by mouth every evening.      Nirmatrelvir & Ritonavir 20 x 150 MG & 10 x 100MG Tablet Therapy Pack Take 300 mg nirmatrelvir (two 150  "mg tablets) with 100 mg ritonavir (one 100 mg tablet) by mouth, with all three tablets taken together twice daily for 5 days. 30 Each 0    citalopram (CELEXA) 10 MG tablet TAKE 1 TABLET BY MOUTH ONCE DAILY IN THE EVENING 90 Tablet 3     No current facility-administered medications for this visit.         Allergies   Allergen Reactions    Bactrim Ds Anaphylaxis    Other Misc Anaphylaxis     CILANTRO    Sulfa Drugs Anaphylaxis    Carrot [Daucus Carota]      Pt reports she gets a bubble in her throat can't breath       Food Shortness of Breath and Itching     Cilantro, pecans, walnuts, carrots, bananas, cantaloupe    Other Environmental Runny Nose and Itching     \"Cat Hair, all Trees, Cows & sagebrush.\"     Pcn [Penicillins] Rash     Rash    Soap Rash     Dial, get a rash    Terbinafine          Family History   Problem Relation Age of Onset    Heart Disease Father     Alcohol/Drug Father     Hyperlipidemia Father     Hypertension Father          Social History     Socioeconomic History    Marital status:      Spouse name: Not on file    Number of children: Not on file    Years of education: Not on file    Highest education level: Not on file   Occupational History    Not on file   Tobacco Use    Smoking status: Never    Smokeless tobacco: Never   Vaping Use    Vaping Use: Never used   Substance and Sexual Activity    Alcohol use: No    Drug use: No    Sexual activity: Yes     Partners: Male     Birth control/protection: Pill   Other Topics Concern    Not on file   Social History Narrative    Not on file     Social Determinants of Health     Financial Resource Strain: Not on file   Food Insecurity: Not on file   Transportation Needs: Not on file   Physical Activity: Not on file   Stress: Not on file   Social Connections: Not on file   Intimate Partner Violence: Not on file   Housing Stability: Not on file         Physical Exam:  Ambulatory Vitals  /87 (BP Location: Left arm, Patient Position: Sitting, BP " "Cuff Size: Adult)   Pulse 88   Ht 1.676 m (5' 6\")    BP Readings from Last 4 Encounters:   10/13/22 119/87   06/14/22 120/84   06/09/22 112/70   03/24/21 131/87     Weight/BMI:   Vitals:    10/13/22 1530   BP: 119/87   Height: 1.676 m (5' 6\")    Body mass index is 26.31 kg/m².  Wt Readings from Last 4 Encounters:   06/14/22 73.9 kg (163 lb)   06/09/22 74.2 kg (163 lb 9.6 oz)   05/20/21 82.2 kg (181 lb 4.8 oz)   03/24/21 77.1 kg (170 lb)       Physical ExamPhysical Exam:  Constitutional: Alert, no distress, well-groomed.  Skin: No rashes in visible areas.  Eye: Round. Conjunctiva clear, lids normal. No icterus.   ENMT: Lips pink without lesions, good dentition, moist mucous membranes. Phonation normal.  Neck: No masses, no thyromegaly. Moves freely without pain.  CV: Pulse as reported by patient  Respiratory: Unlabored respiratory effort, no cough or audible wheeze  Psych: Alert and oriented x3, normal affect and mood.     Lab Data Review:  Lab Results   Component Value Date/Time    CHOLSTRLTOT 179 04/25/2022 08:21 AM     (H) 04/25/2022 08:21 AM    HDL 36 (A) 04/25/2022 08:21 AM    TRIGLYCERIDE 68 04/25/2022 08:21 AM       Lab Results   Component Value Date/Time    SODIUM 140 05/16/2022 07:36 AM    POTASSIUM 3.9 05/16/2022 07:36 AM    CHLORIDE 102 05/16/2022 07:36 AM    CO2 28 05/16/2022 07:36 AM    GLUCOSE 87 05/16/2022 07:36 AM    BUN 19 05/16/2022 07:36 AM    CREATININE 0.77 05/16/2022 07:36 AM     CrCl cannot be calculated (Patient's most recent lab result is older than the maximum 7 days allowed.).  Lab Results   Component Value Date/Time    ALKPHOSPHAT 80 04/25/2022 08:21 AM    ASTSGOT 20 04/25/2022 08:21 AM    ALTSGPT 15 04/25/2022 08:21 AM    TBILIRUBIN 0.6 04/25/2022 08:21 AM      Lab Results   Component Value Date/Time    WBC 5.8 04/25/2022 08:21 AM     No results found for: HBA1C  No components found for: TROP      Cardiac Imaging and Procedures Review:      EKG 2/27/20 normal sinus    TTE " 4/9/2020  CONCLUSIONS  Normal echocardiogram.  No prior study is available for comparison.   Left Ventricle  Normal left ventricular chamber size. Normal left ventricular wall   thickness. Normal left ventricular systolic function. Left ventricular   ejection fraction is visually estimated to be 60%. Normal regional wall   motion. Normal diastolic function.    Medical Decision Making:  Problem List Items Addressed This Visit       Essential hypertension    10 year risk of MI or stroke < 7.5%     Low 10 year risk ascvd / HLD - continue lifestyle changes with diet and regular exercise 150min weekly. No need lipid lowering medication at this time. Annual lipids.    HTN - continue antihypertensives. Goal <140/90.    If palpitations become bothersome can check zio monitor.    If should develop thrombus, only anticoagulant option is coumadin setting of lupus anticoagulant.    It was my pleasure to meet with Ms. Todd.

## 2022-10-21 DIAGNOSIS — I10 ESSENTIAL HYPERTENSION: ICD-10-CM

## 2022-10-21 NOTE — TELEPHONE ENCOUNTER
Is the patient due for a refill? Yes    Was the patient seen the past year? Yes    Date of last office visit: 10/13/22    Does the patient have an upcoming appointment?  No       Provider to refill:VR    Does the patients insurance require a 100 day supply?  No

## 2022-10-25 RX ORDER — SPIRONOLACTONE AND HYDROCHLOROTHIAZIDE 25; 25 MG/1; MG/1
1 TABLET ORAL DAILY
Qty: 90 TABLET | Refills: 3 | Status: SHIPPED | OUTPATIENT
Start: 2022-10-25 | End: 2023-12-19

## 2023-04-25 ENCOUNTER — OFFICE VISIT (OUTPATIENT)
Dept: URGENT CARE | Facility: PHYSICIAN GROUP | Age: 53
End: 2023-04-25
Payer: COMMERCIAL

## 2023-04-25 ENCOUNTER — HOSPITAL ENCOUNTER (OUTPATIENT)
Facility: MEDICAL CENTER | Age: 53
End: 2023-04-25
Attending: NURSE PRACTITIONER
Payer: COMMERCIAL

## 2023-04-25 VITALS
WEIGHT: 168 LBS | DIASTOLIC BLOOD PRESSURE: 72 MMHG | BODY MASS INDEX: 26.37 KG/M2 | HEIGHT: 67 IN | TEMPERATURE: 97.9 F | SYSTOLIC BLOOD PRESSURE: 132 MMHG | HEART RATE: 111 BPM | OXYGEN SATURATION: 97 % | RESPIRATION RATE: 14 BRPM

## 2023-04-25 DIAGNOSIS — R30.0 DYSURIA: ICD-10-CM

## 2023-04-25 PROBLEM — R53.83 FATIGUE: Status: ACTIVE | Noted: 2023-04-25

## 2023-04-25 LAB
APPEARANCE UR: NORMAL
BILIRUB UR STRIP-MCNC: NEGATIVE MG/DL
COLOR UR AUTO: NORMAL
GLUCOSE UR STRIP.AUTO-MCNC: NEGATIVE MG/DL
KETONES UR STRIP.AUTO-MCNC: NEGATIVE MG/DL
LEUKOCYTE ESTERASE UR QL STRIP.AUTO: NORMAL
NITRITE UR QL STRIP.AUTO: NEGATIVE
PH UR STRIP.AUTO: 6.5 [PH] (ref 5–8)
PROT UR QL STRIP: 100 MG/DL
RBC UR QL AUTO: NORMAL
SP GR UR STRIP.AUTO: 1.02
UROBILINOGEN UR STRIP-MCNC: 0.2 MG/DL

## 2023-04-25 PROCEDURE — 87086 URINE CULTURE/COLONY COUNT: CPT

## 2023-04-25 PROCEDURE — 99213 OFFICE O/P EST LOW 20 MIN: CPT | Performed by: NURSE PRACTITIONER

## 2023-04-25 PROCEDURE — 81002 URINALYSIS NONAUTO W/O SCOPE: CPT | Performed by: NURSE PRACTITIONER

## 2023-04-25 RX ORDER — PHENAZOPYRIDINE HYDROCHLORIDE 200 MG/1
200 TABLET, FILM COATED ORAL 3 TIMES DAILY PRN
Qty: 6 TABLET | Refills: 0 | Status: SHIPPED | OUTPATIENT
Start: 2023-04-25

## 2023-04-25 RX ORDER — VENLAFAXINE HYDROCHLORIDE 37.5 MG/1
CAPSULE, EXTENDED RELEASE ORAL
COMMUNITY
Start: 2023-04-13

## 2023-04-25 RX ORDER — CEFDINIR 300 MG/1
300 CAPSULE ORAL 2 TIMES DAILY
Qty: 14 CAPSULE | Refills: 0 | Status: SHIPPED | OUTPATIENT
Start: 2023-04-25 | End: 2023-05-02

## 2023-04-25 ASSESSMENT — FIBROSIS 4 INDEX: FIB4 SCORE: .959024447632312752

## 2023-04-25 NOTE — PROGRESS NOTES
Patient has consented to treatment and for use of patient information for treatment and billing purposes.    Chief Complaint:    Chief Complaint   Patient presents with    Pelvic Pain     X5 days pelvic pressure/ pain, after starting new medication for palpitations and anxiety, urgency, maybe thinks she might have UTI     Dysuria     X5 days UTI, urgency, bleeding, pain        History of Present Illness: 52 y.o.  female presents to clinic with 5-day history of burning/tingling sensation when urinating, urgency, frequency, and suprapubic pressure/pain.  Patient states over the last couple days she has noticed her urine being slightly pink in color.  She also has had mild nausea associated with symptoms.  She denies any fevers, chills, or flank pain.  Her primary care provider recently switched her antidepressants to venlafaxine and she was concerned about a possible side effect to the medication.        Medications, Allergies, and current problem list reviewed today in Epic.    Physical Exam:    Vitals:    04/25/23 1036   BP: 132/72   Pulse: (!) 111   Resp: 14   Temp: 36.6 °C (97.9 °F)   SpO2: 97%             Physical Exam  Constitutional:       Appearance: Normal appearance. She is not ill-appearing or toxic-appearing.   Cardiovascular:      Rate and Rhythm: Normal rate.   Pulmonary:      Effort: Pulmonary effort is normal.   Abdominal:      General: Abdomen is flat. There is no distension.      Palpations: Abdomen is soft. There is no mass.      Tenderness: There is abdominal tenderness in the suprapubic area. There is no right CVA tenderness, left CVA tenderness or guarding.      Hernia: No hernia is present.   Musculoskeletal:      Cervical back: Normal range of motion.   Skin:     General: Skin is warm and dry.   Neurological:      Mental Status: She is alert.   Psychiatric:         Mood and Affect: Mood normal.        Diagnostics:    Results for orders placed or performed in visit on 04/25/23   POCT Urinalysis    Result Value Ref Range    POC Color Dark Yellow Negative    POC Appearance Cloudy Negative    POC Glucose Negative Negative mg/dL    POC Bilirubin Negative Negative mg/dL    POC Ketones Negative Negative mg/dL    POC Specific Gravity 1.020 <1.005 - >1.030    POC Blood Moderate Negative    POC Urine PH 6.5 5.0 - 8.0    POC Protein 100 Negative mg/dL    POC Urobiligen 0.2 Negative (0.2) mg/dL    POC Nitrites Negative Negative    POC Leukocyte Esterase Large Negative       Diagnostics interpreted by myself.      Medical Decision Making:  I personally reviewed prior external notes and test results pertinent to today's visit.   Shared decision-making was utilized with patient did develop treatment plan and clinic course.     HPI and physical exam findings are consistent with urinary tract infection.  POCT urinalysis showed dark cloudy urine that was positive for moderate amount of blood and large amount of leukocytes.  Nitrates were negative.  We will go ahead and treat for UTI.  Urine sent for culture.    She was  started on Omnicef, side effects medication were discussed, low likelihood of allergic reaction as patient does have penicillin allergy.  She has been given cefazolin in the past without any side effects.  Patient was given prescription of Azo to help with bladder spasms and pain.  Recommended that she increase water intake.  She is subsequent appointment with primary care provider on Friday.  Prescription was sent in to preferred pharmacy. AVS was given and reviewed with patient. Patient educated on red flags of UTI and encouraged to seek care back in UC or ER for  fever, chills, flank pain, or worsening symptoms.   Pt educated on red flags and when to seek treatment back in ER or UC.             The patient remained stable during the urgent care visit.    Plan:    1. Dysuria  - POCT Urinalysis  - phenazopyridine (PYRIDIUM) 200 MG Tab; Take 1 Tablet by mouth 3 times a day as needed for Moderate Pain.   Dispense: 6 Tablet; Refill: 0  - cefdinir (OMNICEF) 300 MG Cap; Take 1 Capsule by mouth 2 times a day for 7 days.  Dispense: 14 Capsule; Refill: 0  - URINE CULTURE(NEW); Future    Other orders  - venlafaxine XR (EFFEXOR XR) 37.5 MG CAPSULE SR 24 HR        Verbal and/or printed education was provided regarding the assessment and diagnosis.     Follow up:    Recommended f/u in  3-5 days if there is no improvement.    Patient was encouraged to monitor symptoms closely. Those signs and symptoms which would warrant concern and mandate seeking a higher level of service through the emergency department discussed at length.  Patient stated agreement and understanding of this plan of care.    Disposition:  Home in stable condition       Voice Recognition Disclaimer:  Portions of this document were created using voice recognition software. The software does have a chance of producing errors of grammar and possibly content. I have made every reasonable attempt to correct obvious errors, but there may be errors of grammar and possibly content that I did not discover before finalizing the documentation.

## 2023-04-25 NOTE — LETTER
[facility] URGENT [facility] [address]  [facility] [address]     April 25, 2023    Patient: Penny Todd   YOB: 1970   Date of Visit: 4/25/2023       To Whom It May Concern:    Penny Todd was seen and treated in our department on 4/25/2023.     Sincerely,     TAMEKA Pedraza.

## 2023-04-28 LAB
BACTERIA UR CULT: NORMAL
SIGNIFICANT IND 70042: NORMAL
SITE SITE: NORMAL
SOURCE SOURCE: NORMAL

## 2023-05-03 ENCOUNTER — HOSPITAL ENCOUNTER (OUTPATIENT)
Dept: LAB | Facility: MEDICAL CENTER | Age: 53
End: 2023-05-03
Attending: STUDENT IN AN ORGANIZED HEALTH CARE EDUCATION/TRAINING PROGRAM
Payer: COMMERCIAL

## 2023-05-03 LAB
25(OH)D3 SERPL-MCNC: 42 NG/ML (ref 30–100)
ALBUMIN SERPL BCP-MCNC: 4.4 G/DL (ref 3.2–4.9)
ALBUMIN/GLOB SERPL: 1.1 G/DL
ALP SERPL-CCNC: 90 U/L (ref 30–99)
ALT SERPL-CCNC: 22 U/L (ref 2–50)
ANION GAP SERPL CALC-SCNC: 12 MMOL/L (ref 7–16)
AST SERPL-CCNC: 20 U/L (ref 12–45)
BASOPHILS # BLD AUTO: 1 % (ref 0–1.8)
BASOPHILS # BLD: 0.09 K/UL (ref 0–0.12)
BILIRUB SERPL-MCNC: 0.4 MG/DL (ref 0.1–1.5)
BUN SERPL-MCNC: 18 MG/DL (ref 8–22)
CALCIUM ALBUM COR SERPL-MCNC: 9.4 MG/DL (ref 8.5–10.5)
CALCIUM SERPL-MCNC: 9.7 MG/DL (ref 8.5–10.5)
CHLORIDE SERPL-SCNC: 100 MMOL/L (ref 96–112)
CHOLEST SERPL-MCNC: 177 MG/DL (ref 100–199)
CO2 SERPL-SCNC: 26 MMOL/L (ref 20–33)
CREAT SERPL-MCNC: 0.9 MG/DL (ref 0.5–1.4)
EOSINOPHIL # BLD AUTO: 0.33 K/UL (ref 0–0.51)
EOSINOPHIL NFR BLD: 3.8 % (ref 0–6.9)
ERYTHROCYTE [DISTWIDTH] IN BLOOD BY AUTOMATED COUNT: 40.2 FL (ref 35.9–50)
EST. AVERAGE GLUCOSE BLD GHB EST-MCNC: 120 MG/DL
FASTING STATUS PATIENT QL REPORTED: NORMAL
GFR SERPLBLD CREATININE-BSD FMLA CKD-EPI: 77 ML/MIN/1.73 M 2
GLOBULIN SER CALC-MCNC: 3.9 G/DL (ref 1.9–3.5)
GLUCOSE SERPL-MCNC: 98 MG/DL (ref 65–99)
HBA1C MFR BLD: 5.8 % (ref 4–5.6)
HCT VFR BLD AUTO: 44.6 % (ref 37–47)
HDLC SERPL-MCNC: 34 MG/DL
HGB BLD-MCNC: 15.3 G/DL (ref 12–16)
LDLC SERPL CALC-MCNC: 124 MG/DL
LYMPHOCYTES # BLD AUTO: 3.45 K/UL (ref 1–4.8)
LYMPHOCYTES NFR BLD: 39.6 % (ref 22–41)
MANUAL DIFF BLD: NORMAL
MCH RBC QN AUTO: 30 PG (ref 27–33)
MCHC RBC AUTO-ENTMCNC: 34.3 G/DL (ref 33.6–35)
MCV RBC AUTO: 87.5 FL (ref 81.4–97.8)
METAMYELOCYTES NFR BLD MANUAL: 0.9 %
MONOCYTES # BLD AUTO: 0.74 K/UL (ref 0–0.85)
MONOCYTES NFR BLD AUTO: 8.5 % (ref 0–13.4)
MORPHOLOGY BLD-IMP: NORMAL
NEUTROPHILS # BLD AUTO: 4.02 K/UL (ref 2–7.15)
NEUTROPHILS NFR BLD: 46.2 % (ref 44–72)
NRBC # BLD AUTO: 0 K/UL
NRBC BLD-RTO: 0 /100 WBC
PLATELET # BLD AUTO: 274 K/UL (ref 164–446)
PLATELET BLD QL SMEAR: NORMAL
PMV BLD AUTO: 10.5 FL (ref 9–12.9)
POTASSIUM SERPL-SCNC: 4.1 MMOL/L (ref 3.6–5.5)
PROT SERPL-MCNC: 8.3 G/DL (ref 6–8.2)
RBC # BLD AUTO: 5.1 M/UL (ref 4.2–5.4)
RBC BLD AUTO: PRESENT
SMUDGE CELLS BLD QL SMEAR: NORMAL
SODIUM SERPL-SCNC: 138 MMOL/L (ref 135–145)
T4 FREE SERPL-MCNC: 1.57 NG/DL (ref 0.93–1.7)
TRIGL SERPL-MCNC: 93 MG/DL (ref 0–149)
TSH SERPL DL<=0.005 MIU/L-ACNC: 1.58 UIU/ML (ref 0.38–5.33)
VIT B12 SERPL-MCNC: 1146 PG/ML (ref 211–911)
WBC # BLD AUTO: 8.7 K/UL (ref 4.8–10.8)

## 2023-05-03 PROCEDURE — 80061 LIPID PANEL: CPT

## 2023-05-03 PROCEDURE — 82306 VITAMIN D 25 HYDROXY: CPT

## 2023-05-03 PROCEDURE — 80053 COMPREHEN METABOLIC PANEL: CPT

## 2023-05-03 PROCEDURE — 85007 BL SMEAR W/DIFF WBC COUNT: CPT

## 2023-05-03 PROCEDURE — 84443 ASSAY THYROID STIM HORMONE: CPT

## 2023-05-03 PROCEDURE — 82607 VITAMIN B-12: CPT

## 2023-05-03 PROCEDURE — 36415 COLL VENOUS BLD VENIPUNCTURE: CPT

## 2023-05-03 PROCEDURE — 83036 HEMOGLOBIN GLYCOSYLATED A1C: CPT

## 2023-05-03 PROCEDURE — 84439 ASSAY OF FREE THYROXINE: CPT

## 2023-05-03 PROCEDURE — 85025 COMPLETE CBC W/AUTO DIFF WBC: CPT

## 2023-12-18 DIAGNOSIS — I10 ESSENTIAL HYPERTENSION: ICD-10-CM

## 2023-12-19 RX ORDER — SPIRONOLACTONE AND HYDROCHLOROTHIAZIDE 25; 25 MG/1; MG/1
1 TABLET ORAL DAILY
Qty: 90 TABLET | Refills: 0 | Status: SHIPPED | OUTPATIENT
Start: 2023-12-19

## 2023-12-19 NOTE — TELEPHONE ENCOUNTER
90 day courtesy refill sent to pharmacy. Labs ordered per refill protocol. [facility] message sent to patient, awaiting patient response and will follow up as needed.       To schedulers: please reach out to patient to schedule follow up, thank you!

## 2024-01-24 ENCOUNTER — HOSPITAL ENCOUNTER (OUTPATIENT)
Dept: LAB | Facility: MEDICAL CENTER | Age: 54
End: 2024-01-24
Attending: STUDENT IN AN ORGANIZED HEALTH CARE EDUCATION/TRAINING PROGRAM
Payer: COMMERCIAL

## 2024-01-24 ENCOUNTER — HOSPITAL ENCOUNTER (OUTPATIENT)
Dept: LAB | Facility: MEDICAL CENTER | Age: 54
End: 2024-01-24
Attending: INTERNAL MEDICINE
Payer: COMMERCIAL

## 2024-01-24 DIAGNOSIS — I10 ESSENTIAL HYPERTENSION: ICD-10-CM

## 2024-01-24 LAB
ALBUMIN SERPL BCP-MCNC: 4.4 G/DL (ref 3.2–4.9)
ALBUMIN/GLOB SERPL: 1.3 G/DL
ALP SERPL-CCNC: 84 U/L (ref 30–99)
ALT SERPL-CCNC: 19 U/L (ref 2–50)
ANION GAP SERPL CALC-SCNC: 12 MMOL/L (ref 7–16)
ANION GAP SERPL CALC-SCNC: 9 MMOL/L (ref 7–16)
AST SERPL-CCNC: 18 U/L (ref 12–45)
BILIRUB SERPL-MCNC: 0.4 MG/DL (ref 0.1–1.5)
BUN SERPL-MCNC: 18 MG/DL (ref 8–22)
BUN SERPL-MCNC: 20 MG/DL (ref 8–22)
CALCIUM ALBUM COR SERPL-MCNC: 9.6 MG/DL (ref 8.5–10.5)
CALCIUM SERPL-MCNC: 9.8 MG/DL (ref 8.5–10.5)
CALCIUM SERPL-MCNC: 9.9 MG/DL (ref 8.5–10.5)
CHLORIDE SERPL-SCNC: 100 MMOL/L (ref 96–112)
CHLORIDE SERPL-SCNC: 99 MMOL/L (ref 96–112)
CO2 SERPL-SCNC: 27 MMOL/L (ref 20–33)
CO2 SERPL-SCNC: 29 MMOL/L (ref 20–33)
CREAT SERPL-MCNC: 0.7 MG/DL (ref 0.5–1.4)
CREAT SERPL-MCNC: 0.85 MG/DL (ref 0.5–1.4)
EST. AVERAGE GLUCOSE BLD GHB EST-MCNC: 117 MG/DL
FASTING STATUS PATIENT QL REPORTED: NORMAL
FASTING STATUS PATIENT QL REPORTED: NORMAL
GFR SERPLBLD CREATININE-BSD FMLA CKD-EPI: 103 ML/MIN/1.73 M 2
GFR SERPLBLD CREATININE-BSD FMLA CKD-EPI: 82 ML/MIN/1.73 M 2
GLOBULIN SER CALC-MCNC: 3.5 G/DL (ref 1.9–3.5)
GLUCOSE SERPL-MCNC: 94 MG/DL (ref 65–99)
GLUCOSE SERPL-MCNC: 95 MG/DL (ref 65–99)
HBA1C MFR BLD: 5.7 % (ref 4–5.6)
MAGNESIUM SERPL-MCNC: 2.1 MG/DL (ref 1.5–2.5)
POTASSIUM SERPL-SCNC: 4 MMOL/L (ref 3.6–5.5)
POTASSIUM SERPL-SCNC: 4.1 MMOL/L (ref 3.6–5.5)
PROT SERPL-MCNC: 7.9 G/DL (ref 6–8.2)
SODIUM SERPL-SCNC: 137 MMOL/L (ref 135–145)
SODIUM SERPL-SCNC: 139 MMOL/L (ref 135–145)

## 2024-01-24 PROCEDURE — 36415 COLL VENOUS BLD VENIPUNCTURE: CPT

## 2024-01-24 PROCEDURE — 80053 COMPREHEN METABOLIC PANEL: CPT

## 2024-01-24 PROCEDURE — 80048 BASIC METABOLIC PNL TOTAL CA: CPT

## 2024-01-24 PROCEDURE — 83735 ASSAY OF MAGNESIUM: CPT

## 2024-01-24 PROCEDURE — 83036 HEMOGLOBIN GLYCOSYLATED A1C: CPT

## 2024-02-01 ENCOUNTER — OFFICE VISIT (OUTPATIENT)
Dept: CARDIOLOGY | Facility: PHYSICIAN GROUP | Age: 54
End: 2024-02-01
Payer: COMMERCIAL

## 2024-02-01 VITALS
DIASTOLIC BLOOD PRESSURE: 74 MMHG | WEIGHT: 176 LBS | SYSTOLIC BLOOD PRESSURE: 120 MMHG | RESPIRATION RATE: 12 BRPM | HEIGHT: 67 IN | BODY MASS INDEX: 27.62 KG/M2 | HEART RATE: 87 BPM | OXYGEN SATURATION: 97 %

## 2024-02-01 DIAGNOSIS — Z91.89 10 YEAR RISK OF MI OR STROKE < 7.5%: ICD-10-CM

## 2024-02-01 DIAGNOSIS — I10 ESSENTIAL HYPERTENSION: ICD-10-CM

## 2024-02-01 PROCEDURE — 3078F DIAST BP <80 MM HG: CPT | Performed by: INTERNAL MEDICINE

## 2024-02-01 PROCEDURE — 99214 OFFICE O/P EST MOD 30 MIN: CPT | Performed by: INTERNAL MEDICINE

## 2024-02-01 PROCEDURE — 3074F SYST BP LT 130 MM HG: CPT | Performed by: INTERNAL MEDICINE

## 2024-02-01 ASSESSMENT — ENCOUNTER SYMPTOMS
FEVER: 0
DECREASED APPETITE: 0
NAUSEA: 0
PALPITATIONS: 0
ALTERED MENTAL STATUS: 0
VOMITING: 0
COUGH: 0
BLURRED VISION: 0
DEPRESSION: 0
WEIGHT LOSS: 0
SYNCOPE: 0
CLAUDICATION: 0
FLANK PAIN: 0
NEAR-SYNCOPE: 0
PND: 0
DIZZINESS: 0
DYSPNEA ON EXERTION: 0
WEIGHT GAIN: 0
CONSTIPATION: 0
BACK PAIN: 0
ORTHOPNEA: 0
DIARRHEA: 0
SHORTNESS OF BREATH: 0
IRREGULAR HEARTBEAT: 0
HEARTBURN: 0
ABDOMINAL PAIN: 0

## 2024-02-01 ASSESSMENT — FIBROSIS 4 INDEX: FIB4 SCORE: 0.8

## 2024-02-01 NOTE — PROGRESS NOTES
Cardiology Note    Chief Complaint   Patient presents with    Hypertension     FV Dx: Hypertension       History of Present Illness: Penny Todd is a 53 y.o. female PMH hypothyroidism, HTN, lupus anticoagulant without thrombus who presents for follow up visit.    No active cardiac complaints. Compliant with medications and denies adverse effects. Work is very busy. She hasn't been as active as wants to but she will start exercising again.    Review of Systems   Constitutional: Negative for decreased appetite, fever, malaise/fatigue, weight gain and weight loss.   HENT:  Negative for congestion and nosebleeds.    Eyes:  Negative for blurred vision.   Cardiovascular:  Negative for chest pain, claudication, dyspnea on exertion, irregular heartbeat, leg swelling, near-syncope, orthopnea, palpitations, paroxysmal nocturnal dyspnea and syncope.   Respiratory:  Negative for cough and shortness of breath.    Endocrine: Negative for cold intolerance and heat intolerance.   Skin:  Negative for rash.   Musculoskeletal:  Negative for back pain.   Gastrointestinal:  Negative for abdominal pain, constipation, diarrhea, heartburn, melena, nausea and vomiting.   Genitourinary:  Negative for dysuria, flank pain and hematuria.   Neurological:  Negative for dizziness.   Psychiatric/Behavioral:  Negative for altered mental status and depression.          Past Medical History:   Diagnosis Date    Acid reflux     Allergy     Anxiety     Arrhythmia     Sounds like PVC    Asthma     Inhaler Use PRN    Blood clotting disorder (HCC) 09/06/2019    Pt. states was DX with Lupus Anticoagulant During Pregnancy.    Clotting disorder (HCC)     Hypertension     Lupus anticoagulant disorder (HCC) 09/06/2019    Pt. states H/O with pregnancies.    Miscarriage     x3   Pt. states was DX with Lupus Anticoagulant.    Palpitations     H/O    Thyroid disease     was hyperthyroid and had radiation to thyroid         Past Surgical History:  "  Procedure Laterality Date    PB INCIS TENDON SHEATH,RADIAL STYLOID Right 9/18/2019    Procedure: RELEASE, HAND, FOR DEQUERVAIN'S TENOSYNOVITIS;  Surgeon: Tl Greene M.D.;  Location: SURGERY [facility];  Service: Orthopedics    GANGLION EXCISION Right 9/18/2019    Procedure: EXCISION, GANGLION CYST- WRIST;  Surgeon: Tl Greene M.D.;  Location: SURGERY [facility];  Service: Orthopedics    DENTAL EXTRACTION(S)      wisdom teeth    GYN SURGERY      d & C         Current Outpatient Medications   Medication Sig Dispense Refill    spironolactone/hctz (ALDACTAZIDE) 25-25 MG Tab Take 1 tablet by mouth once daily 90 Tablet 0    venlafaxine XR (EFFEXOR XR) 37.5 MG CAPSULE SR 24 HR       levothyroxine (EUTHYROX) 88 MCG Tab TAKE 1 TABLET BY MOUTH ONCE DAILY IN THE MORNING ON AN EMPTY STOMACH 90 Tablet 3    albuterol 108 (90 Base) MCG/ACT Aero Soln inhalation aerosol Inhale 2 Puffs every 6 hours as needed for Shortness of Breath. 8.5 g 2    NON SPECIFIED Indications: circulation and vein support supplement      Multiple Vitamins-Minerals (MULTIVITAMIN PO) Take 1 Tab by mouth every day.      cetirizine (ZYRTEC) 10 MG Tab Take 10 mg by mouth as needed for Allergies.      JENCYCLA 0.35 MG tablet Take 0.35 mg by mouth every evening.      phenazopyridine (PYRIDIUM) 200 MG Tab Take 1 Tablet by mouth 3 times a day as needed for Moderate Pain. 6 Tablet 0     No current facility-administered medications for this visit.         Allergies   Allergen Reactions    Bactrim Ds Anaphylaxis    Other Misc Anaphylaxis     CILANTRO    Sulfa Drugs Anaphylaxis    Carrot [Daucus Carota]      Pt reports she gets a bubble in her throat can't breath       Food Shortness of Breath and Itching     Cilantro, pecans, walnuts, carrots, bananas, cantaloupe    Other Environmental Runny Nose and Itching     \"Cat Hair, all Trees, Cows & sagebrush.\"     Pcn [Penicillins] Rash     Rash    Soap Rash     Dial, get a rash    " "Terbinafine          Family History   Problem Relation Age of Onset    Heart Disease Father     Alcohol/Drug Father     Hyperlipidemia Father     Hypertension Father          Social History     Socioeconomic History    Marital status:      Spouse name: Not on file    Number of children: Not on file    Years of education: Not on file    Highest education level: Not on file   Occupational History    Not on file   Tobacco Use    Smoking status: Never    Smokeless tobacco: Never   Vaping Use    Vaping Use: Never used   Substance and Sexual Activity    Alcohol use: No    Drug use: No    Sexual activity: Yes     Partners: Male     Birth control/protection: Pill   Other Topics Concern    Not on file   Social History Narrative    Not on file     Social Determinants of Health     Financial Resource Strain: Not on file   Food Insecurity: Not on file   Transportation Needs: Not on file   Physical Activity: Not on file   Stress: Not on file   Social Connections: Not on file   Intimate Partner Violence: Not on file   Housing Stability: Not on file         Physical Exam:  Ambulatory Vitals  /74 (BP Location: Left arm, Patient Position: Sitting, BP Cuff Size: Adult)   Pulse 87   Resp 12   Ht 1.702 m (5' 7\")   Wt 79.8 kg (176 lb)   SpO2 97%    BP Readings from Last 4 Encounters:   02/01/24 120/74   04/25/23 132/72   10/13/22 119/87   06/14/22 120/84     Weight/BMI:   Vitals:    02/01/24 1449   BP: 120/74   Weight: 79.8 kg (176 lb)   Height: 1.702 m (5' 7\")    Body mass index is 27.57 kg/m².  Wt Readings from Last 4 Encounters:   02/01/24 79.8 kg (176 lb)   04/25/23 76.2 kg (168 lb)   06/14/22 73.9 kg (163 lb)   06/09/22 74.2 kg (163 lb 9.6 oz)       Physical Exam  Constitutional:       General: She is not in acute distress.  HENT:      Head: Normocephalic and atraumatic.   Eyes:      Conjunctiva/sclera: Conjunctivae normal.      Pupils: Pupils are equal, round, and reactive to light.   Neck:      Vascular: No JVD. " "  Cardiovascular:      Rate and Rhythm: Normal rate and regular rhythm.      Heart sounds: Normal heart sounds. No murmur heard.     No friction rub. No gallop.   Pulmonary:      Effort: Pulmonary effort is normal. No respiratory distress.      Breath sounds: Normal breath sounds. No wheezing or rales.   Chest:      Chest wall: No tenderness.   Abdominal:      General: Bowel sounds are normal. There is no distension.      Palpations: Abdomen is soft.   Musculoskeletal:      Cervical back: Normal range of motion and neck supple.   Skin:     General: Skin is warm and dry.   Neurological:      Mental Status: She is alert and oriented to person, place, and time.   Psychiatric:         Mood and Affect: Affect normal.         Judgment: Judgment normal.         Lab Data Review:  Lab Results   Component Value Date/Time    CHOLSTRLTOT 177 05/03/2023 07:34 AM     (H) 05/03/2023 07:34 AM    HDL 34 (A) 05/03/2023 07:34 AM    TRIGLYCERIDE 93 05/03/2023 07:34 AM       Lab Results   Component Value Date/Time    SODIUM 139 01/24/2024 07:30 AM    POTASSIUM 4.1 01/24/2024 07:30 AM    CHLORIDE 100 01/24/2024 07:30 AM    CO2 27 01/24/2024 07:30 AM    GLUCOSE 94 01/24/2024 07:30 AM    BUN 18 01/24/2024 07:30 AM    CREATININE 0.70 01/24/2024 07:30 AM     CrCl cannot be calculated (Patient's most recent lab result is older than the maximum 7 days allowed.).  Lab Results   Component Value Date/Time    ALKPHOSPHAT 84 01/24/2024 07:26 AM    ASTSGOT 18 01/24/2024 07:26 AM    ALTSGPT 19 01/24/2024 07:26 AM    TBILIRUBIN 0.4 01/24/2024 07:26 AM      Lab Results   Component Value Date/Time    WBC 8.7 05/03/2023 07:34 AM     Lab Results   Component Value Date/Time    HBA1C 5.7 (H) 01/24/2024 07:26 AM     No components found for: \"TROP\"      Cardiac Imaging and Procedures Review:      EKG 2/27/20 normal sinus    TTE 4/9/2020  CONCLUSIONS  Normal echocardiogram.  No prior study is available for comparison.   Left Ventricle  Normal left " ventricular chamber size. Normal left ventricular wall   thickness. Normal left ventricular systolic function. Left ventricular   ejection fraction is visually estimated to be 60%. Normal regional wall   motion. Normal diastolic function.    Medical Decision Making:  Problem List Items Addressed This Visit       Essential hypertension    10 year risk of MI or stroke < 7.5%       Low 10 year risk ascvd / HLD - continue lifestyle changes with diet and regular exercise 150min weekly moderate intensity. No need lipid lowering medication at this time. Annual lipids with primary care.    HTN - continue antihypertensives. Goal <140/90.    If palpitations become bothersome can check zio monitor.    It was my pleasure to meet with Ms. Todd.

## 2024-04-17 ENCOUNTER — HOSPITAL ENCOUNTER (OUTPATIENT)
Dept: LAB | Facility: MEDICAL CENTER | Age: 54
End: 2024-04-17
Attending: NURSE PRACTITIONER
Payer: COMMERCIAL

## 2024-04-17 LAB
ESTRADIOL SERPL-MCNC: <5 PG/ML
FSH SERPL-ACNC: 67 MIU/ML

## 2024-04-17 PROCEDURE — 83001 ASSAY OF GONADOTROPIN (FSH): CPT

## 2024-04-17 PROCEDURE — 36415 COLL VENOUS BLD VENIPUNCTURE: CPT

## 2024-04-17 PROCEDURE — 82670 ASSAY OF TOTAL ESTRADIOL: CPT

## 2024-07-22 ENCOUNTER — HOSPITAL ENCOUNTER (OUTPATIENT)
Dept: LAB | Facility: MEDICAL CENTER | Age: 54
End: 2024-07-22
Attending: STUDENT IN AN ORGANIZED HEALTH CARE EDUCATION/TRAINING PROGRAM
Payer: COMMERCIAL

## 2024-07-22 ENCOUNTER — HOSPITAL ENCOUNTER (OUTPATIENT)
Dept: LAB | Facility: MEDICAL CENTER | Age: 54
End: 2024-07-22
Attending: NURSE PRACTITIONER
Payer: COMMERCIAL

## 2024-07-22 LAB
ALBUMIN SERPL BCP-MCNC: 4.2 G/DL (ref 3.2–4.9)
ALBUMIN/GLOB SERPL: 1.2 G/DL
ALP SERPL-CCNC: 95 U/L (ref 30–99)
ALT SERPL-CCNC: 36 U/L (ref 2–50)
ANION GAP SERPL CALC-SCNC: 11 MMOL/L (ref 7–16)
AST SERPL-CCNC: 27 U/L (ref 12–45)
BASOPHILS # BLD AUTO: 1.1 % (ref 0–1.8)
BASOPHILS # BLD: 0.08 K/UL (ref 0–0.12)
BILIRUB SERPL-MCNC: 0.4 MG/DL (ref 0.1–1.5)
BUN SERPL-MCNC: 17 MG/DL (ref 8–22)
CALCIUM ALBUM COR SERPL-MCNC: 9.2 MG/DL (ref 8.5–10.5)
CALCIUM SERPL-MCNC: 9.4 MG/DL (ref 8.5–10.5)
CHLORIDE SERPL-SCNC: 102 MMOL/L (ref 96–112)
CHOLEST SERPL-MCNC: 164 MG/DL (ref 100–199)
CO2 SERPL-SCNC: 27 MMOL/L (ref 20–33)
CREAT SERPL-MCNC: 0.77 MG/DL (ref 0.5–1.4)
EOSINOPHIL # BLD AUTO: 0.29 K/UL (ref 0–0.51)
EOSINOPHIL NFR BLD: 4.1 % (ref 0–6.9)
ERYTHROCYTE [DISTWIDTH] IN BLOOD BY AUTOMATED COUNT: 41.4 FL (ref 35.9–50)
ESTRADIOL SERPL-MCNC: 9.2 PG/ML
FASTING STATUS PATIENT QL REPORTED: NORMAL
FERRITIN SERPL-MCNC: 244 NG/ML (ref 10–291)
FSH SERPL-ACNC: 60.8 MIU/ML
GFR SERPLBLD CREATININE-BSD FMLA CKD-EPI: 92 ML/MIN/1.73 M 2
GLOBULIN SER CALC-MCNC: 3.5 G/DL (ref 1.9–3.5)
GLUCOSE SERPL-MCNC: 93 MG/DL (ref 65–99)
HCT VFR BLD AUTO: 42.9 % (ref 37–47)
HDLC SERPL-MCNC: 42 MG/DL
HGB BLD-MCNC: 14.3 G/DL (ref 12–16)
IMM GRANULOCYTES # BLD AUTO: 0.14 K/UL (ref 0–0.11)
IMM GRANULOCYTES NFR BLD AUTO: 2 % (ref 0–0.9)
LDLC SERPL CALC-MCNC: 100 MG/DL
LYMPHOCYTES # BLD AUTO: 1.79 K/UL (ref 1–4.8)
LYMPHOCYTES NFR BLD: 25.3 % (ref 22–41)
MAGNESIUM SERPL-MCNC: 1.9 MG/DL (ref 1.5–2.5)
MCH RBC QN AUTO: 30.2 PG (ref 27–33)
MCHC RBC AUTO-ENTMCNC: 33.3 G/DL (ref 32.2–35.5)
MCV RBC AUTO: 90.5 FL (ref 81.4–97.8)
MONOCYTES # BLD AUTO: 0.46 K/UL (ref 0–0.85)
MONOCYTES NFR BLD AUTO: 6.5 % (ref 0–13.4)
NEUTROPHILS # BLD AUTO: 4.32 K/UL (ref 1.82–7.42)
NEUTROPHILS NFR BLD: 61 % (ref 44–72)
NRBC # BLD AUTO: 0 K/UL
NRBC BLD-RTO: 0 /100 WBC (ref 0–0.2)
PLATELET # BLD AUTO: 271 K/UL (ref 164–446)
PMV BLD AUTO: 10.5 FL (ref 9–12.9)
POTASSIUM SERPL-SCNC: 3.7 MMOL/L (ref 3.6–5.5)
PROT SERPL-MCNC: 7.7 G/DL (ref 6–8.2)
RBC # BLD AUTO: 4.74 M/UL (ref 4.2–5.4)
SODIUM SERPL-SCNC: 140 MMOL/L (ref 135–145)
T4 FREE SERPL-MCNC: 1.39 NG/DL (ref 0.93–1.7)
TRIGL SERPL-MCNC: 109 MG/DL (ref 0–149)
TSH SERPL-ACNC: 2.85 UIU/ML (ref 0.35–5.5)
WBC # BLD AUTO: 7.1 K/UL (ref 4.8–10.8)

## 2024-07-22 PROCEDURE — 80053 COMPREHEN METABOLIC PANEL: CPT

## 2024-07-22 PROCEDURE — 83001 ASSAY OF GONADOTROPIN (FSH): CPT

## 2024-07-22 PROCEDURE — 82728 ASSAY OF FERRITIN: CPT

## 2024-07-22 PROCEDURE — 82670 ASSAY OF TOTAL ESTRADIOL: CPT

## 2024-07-22 PROCEDURE — 83735 ASSAY OF MAGNESIUM: CPT

## 2024-07-22 PROCEDURE — 36415 COLL VENOUS BLD VENIPUNCTURE: CPT

## 2024-07-22 PROCEDURE — 84443 ASSAY THYROID STIM HORMONE: CPT

## 2024-07-22 PROCEDURE — 85025 COMPLETE CBC W/AUTO DIFF WBC: CPT

## 2024-07-22 PROCEDURE — 84439 ASSAY OF FREE THYROXINE: CPT

## 2024-07-22 PROCEDURE — 80061 LIPID PANEL: CPT

## 2025-04-16 ENCOUNTER — HOSPITAL ENCOUNTER (OUTPATIENT)
Dept: LAB | Facility: MEDICAL CENTER | Age: 55
End: 2025-04-16
Attending: STUDENT IN AN ORGANIZED HEALTH CARE EDUCATION/TRAINING PROGRAM
Payer: COMMERCIAL

## 2025-04-16 LAB
25(OH)D3 SERPL-MCNC: 36 NG/ML (ref 30–100)
ALBUMIN SERPL BCP-MCNC: 4.1 G/DL (ref 3.2–4.9)
ALBUMIN/GLOB SERPL: 1.1 G/DL
ALP SERPL-CCNC: 93 U/L (ref 30–99)
ALT SERPL-CCNC: 26 U/L (ref 2–50)
ANION GAP SERPL CALC-SCNC: 10 MMOL/L (ref 7–16)
AST SERPL-CCNC: 24 U/L (ref 12–45)
BASOPHILS # BLD AUTO: 1.1 % (ref 0–1.8)
BASOPHILS # BLD: 0.08 K/UL (ref 0–0.12)
BILIRUB SERPL-MCNC: 0.5 MG/DL (ref 0.1–1.5)
BUN SERPL-MCNC: 17 MG/DL (ref 8–22)
CALCIUM ALBUM COR SERPL-MCNC: 9.4 MG/DL (ref 8.5–10.5)
CALCIUM SERPL-MCNC: 9.5 MG/DL (ref 8.5–10.5)
CHLORIDE SERPL-SCNC: 102 MMOL/L (ref 96–112)
CHOLEST SERPL-MCNC: 188 MG/DL (ref 100–199)
CO2 SERPL-SCNC: 27 MMOL/L (ref 20–33)
CREAT SERPL-MCNC: 0.8 MG/DL (ref 0.5–1.4)
EOSINOPHIL # BLD AUTO: 0.35 K/UL (ref 0–0.51)
EOSINOPHIL NFR BLD: 4.7 % (ref 0–6.9)
ERYTHROCYTE [DISTWIDTH] IN BLOOD BY AUTOMATED COUNT: 41.7 FL (ref 35.9–50)
EST. AVERAGE GLUCOSE BLD GHB EST-MCNC: 131 MG/DL
FASTING STATUS PATIENT QL REPORTED: NORMAL
GFR SERPLBLD CREATININE-BSD FMLA CKD-EPI: 87 ML/MIN/1.73 M 2
GLOBULIN SER CALC-MCNC: 3.8 G/DL (ref 1.9–3.5)
GLUCOSE SERPL-MCNC: 97 MG/DL (ref 65–99)
HBA1C MFR BLD: 6.2 % (ref 4–5.6)
HCT VFR BLD AUTO: 43.6 % (ref 37–47)
HDLC SERPL-MCNC: 40 MG/DL
HGB BLD-MCNC: 14.5 G/DL (ref 12–16)
IMM GRANULOCYTES # BLD AUTO: 0.04 K/UL (ref 0–0.11)
IMM GRANULOCYTES NFR BLD AUTO: 0.5 % (ref 0–0.9)
LDLC SERPL CALC-MCNC: 121 MG/DL
LYMPHOCYTES # BLD AUTO: 2.01 K/UL (ref 1–4.8)
LYMPHOCYTES NFR BLD: 27 % (ref 22–41)
MCH RBC QN AUTO: 29.8 PG (ref 27–33)
MCHC RBC AUTO-ENTMCNC: 33.3 G/DL (ref 32.2–35.5)
MCV RBC AUTO: 89.7 FL (ref 81.4–97.8)
MONOCYTES # BLD AUTO: 0.58 K/UL (ref 0–0.85)
MONOCYTES NFR BLD AUTO: 7.8 % (ref 0–13.4)
NEUTROPHILS # BLD AUTO: 4.38 K/UL (ref 1.82–7.42)
NEUTROPHILS NFR BLD: 58.9 % (ref 44–72)
NRBC # BLD AUTO: 0 K/UL
NRBC BLD-RTO: 0 /100 WBC (ref 0–0.2)
PLATELET # BLD AUTO: 286 K/UL (ref 164–446)
PMV BLD AUTO: 10.1 FL (ref 9–12.9)
POTASSIUM SERPL-SCNC: 3.8 MMOL/L (ref 3.6–5.5)
PROT SERPL-MCNC: 7.9 G/DL (ref 6–8.2)
RBC # BLD AUTO: 4.86 M/UL (ref 4.2–5.4)
SODIUM SERPL-SCNC: 139 MMOL/L (ref 135–145)
T4 FREE SERPL-MCNC: 1.38 NG/DL (ref 0.93–1.7)
TRIGL SERPL-MCNC: 133 MG/DL (ref 0–149)
TSH SERPL-ACNC: 5.43 UIU/ML (ref 0.38–5.33)
VIT B12 SERPL-MCNC: 919 PG/ML (ref 211–911)
WBC # BLD AUTO: 7.4 K/UL (ref 4.8–10.8)

## 2025-04-16 PROCEDURE — 80053 COMPREHEN METABOLIC PANEL: CPT

## 2025-04-16 PROCEDURE — 36415 COLL VENOUS BLD VENIPUNCTURE: CPT

## 2025-04-16 PROCEDURE — 80061 LIPID PANEL: CPT

## 2025-04-16 PROCEDURE — 84439 ASSAY OF FREE THYROXINE: CPT

## 2025-04-16 PROCEDURE — 84443 ASSAY THYROID STIM HORMONE: CPT

## 2025-04-16 PROCEDURE — 83036 HEMOGLOBIN GLYCOSYLATED A1C: CPT

## 2025-04-16 PROCEDURE — 85025 COMPLETE CBC W/AUTO DIFF WBC: CPT

## 2025-04-16 PROCEDURE — 82607 VITAMIN B-12: CPT

## 2025-04-16 PROCEDURE — 82306 VITAMIN D 25 HYDROXY: CPT

## 2025-06-10 ENCOUNTER — HOSPITAL ENCOUNTER (OUTPATIENT)
Dept: LAB | Facility: MEDICAL CENTER | Age: 55
End: 2025-06-10
Attending: STUDENT IN AN ORGANIZED HEALTH CARE EDUCATION/TRAINING PROGRAM
Payer: COMMERCIAL

## 2025-06-10 LAB
T4 FREE SERPL-MCNC: 2.38 NG/DL (ref 0.93–1.7)
TSH SERPL-ACNC: 0.19 UIU/ML (ref 0.38–5.33)

## 2025-06-10 PROCEDURE — 84439 ASSAY OF FREE THYROXINE: CPT

## 2025-06-10 PROCEDURE — 36415 COLL VENOUS BLD VENIPUNCTURE: CPT

## 2025-06-10 PROCEDURE — 84443 ASSAY THYROID STIM HORMONE: CPT

## (undated) DEVICE — STOCKINETTE, TUBULAR 4

## (undated) DEVICE — TUBING CLEARLINK DUO-VENT - C-FLO (48EA/CA)

## (undated) DEVICE — GLOVE BIOGEL SZ 8 SURGICAL PF LTX - (50PR/BX 4BX/CA)

## (undated) DEVICE — CLOSURE SKIN STRIP 1/2 X 4 IN - (STERI STRIP) (50/BX 4BX/CA)

## (undated) DEVICE — SUTURE 4-0 ETHILON PS-2 18 BLACK (36PK/BX)

## (undated) DEVICE — STOCKINET TUBULAR 4IN STERILE - 4 X 36YDS (25/CA)

## (undated) DEVICE — BENZOIN SPRAY, TINCTURE

## (undated) DEVICE — SUCTION INSTRUMENT YANKAUER BULBOUS TIP W/O VENT (50EA/CA)

## (undated) DEVICE — PROTECTOR ULNA NERVE - (36PR/CA)

## (undated) DEVICE — SODIUM CHL IRRIGATION 0.9% 1000ML (12EA/CA)

## (undated) DEVICE — MASK ANESTHESIA ADULT  - (100/CA)

## (undated) DEVICE — SUTURE 3-0 MONOCRYL PLUS PS-2 - (12/BX)

## (undated) DEVICE — NEPTUNE 4 PORT MANIFOLD - (20/PK)

## (undated) DEVICE — CHLORAPREP 26 ML APPLICATOR - ORANGE TINT(25/CA)

## (undated) DEVICE — SET LEADWIRE 5 LEAD BEDSIDE DISPOSABLE ECG (1SET OF 5/EA)

## (undated) DEVICE — ELECTRODE 850 FOAM ADHESIVE - HYDROGEL RADIOTRNSPRNT (50/PK)

## (undated) DEVICE — KIT ANESTHESIA W/CIRCUIT & 3/LT BAG W/FILTER (20EA/CA)

## (undated) DEVICE — ELECTRODE DUAL RETURN W/ CORD - (50/PK)

## (undated) DEVICE — HEAD HOLDER JUNIOR/ADULT

## (undated) DEVICE — PACK LOWER EXTREMITY - (2/CA)

## (undated) DEVICE — PADDING CAST 4 IN STERILE - 4 X 4 YDS (24/CA)

## (undated) DEVICE — KIT ROOM DECONTAMINATION

## (undated) DEVICE — CANISTER SUCTION 3000ML MECHANICAL FILTER AUTO SHUTOFF MEDI-VAC NONSTERILE LF DISP  (40EA/CA)

## (undated) DEVICE — SET EXTENSION WITH 2 PORTS (48EA/CA) ***PART #2C8610 IS A SUBSTITUTE*****

## (undated) DEVICE — DETERGENT RENUZYME PLUS 10 OZ PACKET (50/BX)

## (undated) DEVICE — LACTATED RINGERS INJ 1000 ML - (14EA/CA 60CA/PF)

## (undated) DEVICE — SPLINT PLASTER 4 IN  X 15 IN - (50/BX 12BX/CA)

## (undated) DEVICE — GLOVE BIOGEL ECLIPSE PF LATEX SIZE 7.5

## (undated) DEVICE — SENSOR SPO2 NEO LNCS ADHESIVE (20/BX) SEE USER NOTES

## (undated) DEVICE — STOCKINET BIAS 4 IN STERILE - (20/CA)

## (undated) DEVICE — SUTURE GENERAL